# Patient Record
Sex: MALE | Race: WHITE | NOT HISPANIC OR LATINO | Employment: FULL TIME | URBAN - METROPOLITAN AREA
[De-identification: names, ages, dates, MRNs, and addresses within clinical notes are randomized per-mention and may not be internally consistent; named-entity substitution may affect disease eponyms.]

---

## 2018-02-19 ENCOUNTER — OFFICE VISIT (OUTPATIENT)
Dept: FAMILY MEDICINE | Facility: CLINIC | Age: 48
End: 2018-02-19
Payer: COMMERCIAL

## 2018-02-19 VITALS
SYSTOLIC BLOOD PRESSURE: 130 MMHG | HEIGHT: 67 IN | DIASTOLIC BLOOD PRESSURE: 92 MMHG | BODY MASS INDEX: 32.8 KG/M2 | TEMPERATURE: 98.1 F | HEART RATE: 50 BPM | WEIGHT: 209 LBS

## 2018-02-19 DIAGNOSIS — R03.0 ELEVATED BLOOD PRESSURE READING WITHOUT DIAGNOSIS OF HYPERTENSION: ICD-10-CM

## 2018-02-19 DIAGNOSIS — F41.1 GAD (GENERALIZED ANXIETY DISORDER): Primary | ICD-10-CM

## 2018-02-19 PROBLEM — E66.811 OBESITY (BMI 30.0-34.9): Status: ACTIVE | Noted: 2018-02-19

## 2018-02-19 PROCEDURE — 99203 OFFICE O/P NEW LOW 30 MIN: CPT | Performed by: INTERNAL MEDICINE

## 2018-02-19 ASSESSMENT — ANXIETY QUESTIONNAIRES
2. NOT BEING ABLE TO STOP OR CONTROL WORRYING: NOT AT ALL
GAD7 TOTAL SCORE: 1
IF YOU CHECKED OFF ANY PROBLEMS ON THIS QUESTIONNAIRE, HOW DIFFICULT HAVE THESE PROBLEMS MADE IT FOR YOU TO DO YOUR WORK, TAKE CARE OF THINGS AT HOME, OR GET ALONG WITH OTHER PEOPLE: NOT DIFFICULT AT ALL
7. FEELING AFRAID AS IF SOMETHING AWFUL MIGHT HAPPEN: NOT AT ALL
5. BEING SO RESTLESS THAT IT IS HARD TO SIT STILL: NOT AT ALL
1. FEELING NERVOUS, ANXIOUS, OR ON EDGE: SEVERAL DAYS
6. BECOMING EASILY ANNOYED OR IRRITABLE: NOT AT ALL
3. WORRYING TOO MUCH ABOUT DIFFERENT THINGS: NOT AT ALL

## 2018-02-19 ASSESSMENT — PATIENT HEALTH QUESTIONNAIRE - PHQ9: 5. POOR APPETITE OR OVEREATING: NOT AT ALL

## 2018-02-19 NOTE — PROGRESS NOTES
"  SUBJECTIVE:   Black Benton is a 47 year old male who presents to clinic today for the following health issues:    Richard lives with his wife and 15 year old son (has dealt with some behavior problems).  Older 20 year old son is in college.  He works in [employer].   Was following at [facility], but due to insurance change will be following here.  He takes fluoxetine 20 mg for anxiety counseling or change of dose.    Initial blood pressure 160/90, repeat was 130/92.  He does not usually check his blood pressure at home, recently it was high  at the dentist office.  He has been going to the gym more often, trying to be more active, knows he should eat less salt in his diet.        Patient Active Problem List   Diagnosis     Elevated blood pressure reading without diagnosis of hypertension     ALEXIA (generalized anxiety disorder)     Obesity (BMI 30.0-34.9)     No past surgical history on file.    Social History   Substance Use Topics     Smoking status: Never Smoker     Smokeless tobacco: Never Used     Alcohol use Yes     No family history on file.      Allergies   Allergen Reactions     Omeprazole Rash     Sulfa Drugs Rash       Reviewed and updated as needed this visit by clinical staff  Tobacco  Allergies  Meds  Problems  Soc Hx      Reviewed and updated as needed this visit by Provider  Problems         ROS:  Const, cv, pulm, psych reviewed,  otherwise negative unless noted above.       OBJECTIVE:     BP (!) 130/92 (BP Location: Right arm, Patient Position: Chair, Cuff Size: Adult Large)  Pulse 50  Temp 98.1  F (36.7  C) (Tympanic)  Ht 5' 6.75\" (1.695 m)  Wt 209 lb (94.8 kg)  BMI 32.98 kg/m2  Body mass index is 32.98 kg/(m^2).  GENERAL: healthy, alert and no distress  RESP: lungs clear to auscultation - no rales, rhonchi or wheezes  CV: regular rate and rhythm, normal S1 S2, no S3 or S4, no murmur, click or rub  PSYCH: mentation appears normal, affect normal/bright    Diagnostic Test " Results:  none     ASSESSMENT/PLAN:       1. ALEXIA (generalized anxiety disorder)  Doing well, refill ordered   - FLUoxetine (PROZAC) 20 MG capsule; Take 1 capsule (20 mg) by mouth daily  Dispense: 90 capsule; Refill: 3    2. Elevated blood pressure reading without diagnosis of hypertension  Recommended getting home cuff, check a few times per month.  Encouraged to continue working on exercise, eating healthy.      Follow up 4-6 months for physical         Ekaterina Mccray MD  [facility] [place]

## 2018-02-19 NOTE — MR AVS SNAPSHOT
"              After Visit Summary   2/19/2018    Black Benton    MRN: [id number]           Patient Information     Date Of Birth          1970        Visit Information        Provider Department      2/19/2018 8:40 AM Ekaterina Mccray MD [facility] [place]        Today's Diagnoses     ALEXIA (generalized anxiety disorder)    -  1      Care Instructions    Goal for blood pressure is < 120/80.              Follow-ups after your visit        Follow-up notes from your care team     Return in about 6 months (around 8/19/2018) for Physical Exam.      Who to contact     If you have questions or need follow up information about today's clinic visit or your schedule please contact [facility] directly at [phone].  Normal or non-critical lab and imaging results will be communicated to you by MyChart, letter or phone within 4 business days after the clinic has received the results. If you do not hear from us within 7 days, please contact the clinic through MyChart or phone. If you have a critical or abnormal lab result, we will notify you by phone as soon as possible.  Submit refill requests through [employer] or call your pharmacy and they will forward the refill request to us. Please allow 3 business days for your refill to be completed.          Additional Information About Your Visit        MyChart Information     H[employer]Time lets you send messages to your doctor, view your test results, renew your prescriptions, schedule appointments and more. To sign up, go to [email or web address] . Click on \"Log in\" on the left side of the screen, which will take you to the Welcome page. Then click on \"Sign up Now\" on the right side of the page.     You will be asked to enter the access code listed below, as well as some personal information. Please follow the directions to create your username and password.     Your access code is: [email or web address]  Expires: 5/20/2018  9:06 AM     Your access code " "will  in 90 days. If you need help or a new code, please call your [place] clinic or [phone].        Care EveryWhere ID     This is your Care EveryWhere ID. This could be used by other organizations to access your [place] medical records  [id number]        Your Vitals Were     Pulse Temperature Height BMI (Body Mass Index)          50 98.1  F (36.7  C) (Tympanic) 5' 6.75\" (1.695 m) 32.98 kg/m2         Blood Pressure from Last 3 Encounters:   18 163/90    Weight from Last 3 Encounters:   18 209 lb (94.8 kg)              Today, you had the following     No orders found for display         Today's Medication Changes          These changes are accurate as of 18  9:06 AM.  If you have any questions, ask your nurse or doctor.               These medicines have changed or have updated prescriptions.        Dose/Directions    FLUoxetine 20 MG capsule   Commonly known as:  PROzac   This may have changed:    - how much to take  - how to take this  - when to take this   Used for:  ALEXIA (generalized anxiety disorder)   Changed by:  Ekaterina Mccray MD        Dose:  20 mg   Take 1 capsule (20 mg) by mouth daily   Quantity:  90 capsule   Refills:  3            Where to get your medicines      These medications were sent to [facility] [address] [facility] [employer] - [facility] [address] [facility]  [address] [address]     Phone:  [phone]     FLUoxetine 20 MG capsule                Primary Care Provider Office Phone # Fax #    Ekaterina Mccray [phone]       [address] [facility] [address]        Equal Access to Services     SARAH COLE AH: Hadii nas almonte Sopetty, waaxda luqadaha, qaybta kaalmada nedra, delmy johnson. So [facility] [phone].    ATENCIÓN: Si habla español, tiene a stephenson disposición servicios gratuitos de asistencia lingüística. Llame al [phone].    We comply with applicable federal " civil rights laws and [place] laws. We do not discriminate on the basis of race, color, national origin, age, disability, sex, sexual orientation, or gender identity.            Thank you!     Thank you for choosing [facility]  for your care. Our goal is always to provide you with excellent care. Hearing back from our patients is one way we can continue to improve our services. Please take a few minutes to complete the written survey that you may receive in the mail after your visit with us. Thank you!             Your Updated Medication List - Protect others around you: Learn how to safely use, store and throw away your medicines at www.disposemymeds.org.          This list is accurate as of 2/19/18  9:06 AM.  Always use your most recent med list.                   Brand Name Dispense Instructions for use Diagnosis    FLUoxetine 20 MG capsule    PROzac    90 capsule    Take 1 capsule (20 mg) by mouth daily    ALEXIA (generalized anxiety disorder)

## 2018-02-20 ASSESSMENT — PATIENT HEALTH QUESTIONNAIRE - PHQ9: SUM OF ALL RESPONSES TO PHQ QUESTIONS 1-9: 0

## 2018-02-20 ASSESSMENT — ANXIETY QUESTIONNAIRES: GAD7 TOTAL SCORE: 1

## 2018-08-27 ENCOUNTER — OFFICE VISIT (OUTPATIENT)
Dept: FAMILY MEDICINE | Facility: CLINIC | Age: 48
End: 2018-08-27
Payer: COMMERCIAL

## 2018-08-27 VITALS
BODY MASS INDEX: 32.82 KG/M2 | HEART RATE: 104 BPM | SYSTOLIC BLOOD PRESSURE: 130 MMHG | DIASTOLIC BLOOD PRESSURE: 90 MMHG | OXYGEN SATURATION: 100 % | TEMPERATURE: 98.4 F | WEIGHT: 208 LBS

## 2018-08-27 DIAGNOSIS — M99.02 THORACIC SEGMENT DYSFUNCTION: Primary | ICD-10-CM

## 2018-08-27 DIAGNOSIS — E66.811 CLASS 1 OBESITY DUE TO EXCESS CALORIES WITH SERIOUS COMORBIDITY AND BODY MASS INDEX (BMI) OF 32.0 TO 32.9 IN ADULT: ICD-10-CM

## 2018-08-27 DIAGNOSIS — I10 BENIGN ESSENTIAL HYPERTENSION: ICD-10-CM

## 2018-08-27 DIAGNOSIS — E66.09 CLASS 1 OBESITY DUE TO EXCESS CALORIES WITH SERIOUS COMORBIDITY AND BODY MASS INDEX (BMI) OF 32.0 TO 32.9 IN ADULT: ICD-10-CM

## 2018-08-27 PROCEDURE — 99214 OFFICE O/P EST MOD 30 MIN: CPT | Performed by: INTERNAL MEDICINE
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
## 2018-08-27 NOTE — MR AVS SNAPSHOT
After Visit Summary   8/27/2018    Black Benton    MRN: [id number]           Patient Information     Date Of Birth          1970        Visit Information        Provider Department      8/27/2018 3:40 PM Serina Smart MD [facility] [place]        Today's Diagnoses     Thoracic segment dysfunction    -  1       Follow-ups after your visit        Additional Services     [facility] HAND, AND CHIROPRACTIC REFERRAL       **This order will print in the [facility] Scheduling Office**    Physical Therapy, Hand Therapy and Chiropractic Care are available through:    *[facility]  *[facility]  *[facility]    Call one number to schedule at any of the above locations: [phone].    Your provider has referred you to: Chiropractic at [facility] or [facility] [place]    Indication/Reason for Referral: Thoracic back pain    Onset of Illness: About a month  Therapy Orders: Evaluate and Treat  Special Programs: None  Special Request: None    Edgard Rene      Additional Comments for the Therapist or Chiropractor:     Please be aware that coverage of these services is subject to the terms and limitations of your health insurance plan.  Call member services at your health plan with any benefit or coverage questions.      Please bring the following to your appointment:    *Your personal calendar for scheduling future appointments  *Comfortable clothing                  Follow-up notes from your care team     Return in about 2 months (around 10/27/2018) for Physical Exam.      Who to contact     If you have questions or need follow up information about today's clinic visit or your schedule please contact [facility] [place] directly at [phone].  Normal or non-critical lab and imaging results will be communicated to you by MyChart, letter or phone within 4 business days after the clinic has received the results. If you do not hear from us within 7 days,  "please contact the clinic through True Office or phone. If you have a critical or abnormal lab result, we will notify you by phone as soon as possible.  Submit refill requests through True Office or call your pharmacy and they will forward the refill request to us. Please allow 3 business days for your refill to be completed.          Additional Information About Your Visit        [employer] Information     True Office lets you send messages to your doctor, view your test results, renew your prescriptions, schedule appointments and more. To sign up, go to [email or web address] . Click on \"Log in\" on the left side of the screen, which will take you to the Welcome page. Then click on \"Sign up Now\" on the right side of the page.     You will be asked to enter the access code listed below, as well as some personal information. Please follow the directions to create your username and password.     Your access code is: [email or web address]  Expires: 2018  3:31 PM     Your access code will  in 90 days. If you need help or a new code, please call your [place] clinic or [phone].        Care EveryWhere ID     This is your Care EveryWhere ID. This could be used by other organizations to access your [place] medical records  [id number]        Your Vitals Were     Pulse Temperature Pulse Oximetry BMI (Body Mass Index)          104 98.4  F (36.9  C) (Oral) 100% 32.82 kg/m2         Blood Pressure from Last 3 Encounters:   18 130/90   18 (!) 130/92    Weight from Last 3 Encounters:   18 208 lb (94.3 kg)   18 209 lb (94.8 kg)              We Performed the Following     [facility], HAND, AND CHIROPRACTIC REFERRAL        Primary Care Provider Office Phone # Fax #    Ekaterina Mccray [phone]       [address] [place] [address]        Equal Access to Services     PERFECTO COLE AH: Edmond Lezama, waaxda josefaqjoaquim, qaybta clemente sneed, delmy vergara " fred thomas ah. So [facility] [phone].    ATENCIÓN: Si habla rene, tiene a stephenson disposición servicios gratuitos de asistencia lingüística. Savanah al [phone].    We comply with applicable federal civil rights laws and [place] laws. We do not discriminate on the basis of race, color, national origin, age, disability, sex, sexual orientation, or gender identity.            Thank you!     [facility]. Our goal is always to provide you with excellent care. Hearing back from our patients is one way we can continue to improve our services. Please take a few minutes to complete the written survey that you may receive in the mail after your visit with us. Thank you!             Your Updated Medication List - Protect others around you: Learn how to safely use, store and throw away your medicines at www.disposemymeds.org.          This list is accurate as of 8/27/18  4:03 PM.  Always use your most recent med list.                   Brand Name Dispense Instructions for use Diagnosis    FLUoxetine 20 MG capsule    PROzac    90 capsule    Take 1 capsule (20 mg) by mouth daily    ALEXIA (generalized anxiety disorder)

## 2018-09-05 ENCOUNTER — THERAPY VISIT (OUTPATIENT)
Dept: CHIROPRACTIC MEDICINE | Facility: CLINIC | Age: 48
End: 2018-09-05
Payer: COMMERCIAL

## 2018-09-05 DIAGNOSIS — M54.6 ACUTE RIGHT-SIDED THORACIC BACK PAIN: ICD-10-CM

## 2018-09-05 DIAGNOSIS — M40.00 ACQUIRED POSTURAL KYPHOSIS: ICD-10-CM

## 2018-09-05 DIAGNOSIS — M54.2 CERVICALGIA: ICD-10-CM

## 2018-09-05 DIAGNOSIS — M99.02 THORACIC SEGMENT DYSFUNCTION: Primary | ICD-10-CM

## 2018-09-05 DIAGNOSIS — M99.01 CERVICAL SEGMENT DYSFUNCTION: ICD-10-CM

## 2018-09-05 PROCEDURE — 99203 OFFICE O/P NEW LOW 30 MIN: CPT | Mod: 25 | Performed by: CHIROPRACTOR

## 2018-09-05 PROCEDURE — 98940 CHIROPRACT MANJ 1-2 REGIONS: CPT | Mod: AT | Performed by: CHIROPRACTOR

## 2018-09-05 PROCEDURE — 97112 NEUROMUSCULAR REEDUCATION: CPT | Mod: 59 | Performed by: CHIROPRACTOR

## 2018-09-06 PROBLEM — M40.00 ACQUIRED POSTURAL KYPHOSIS: Status: ACTIVE | Noted: 2018-09-06

## 2018-09-06 PROBLEM — M99.02 THORACIC SEGMENT DYSFUNCTION: Status: ACTIVE | Noted: 2018-09-06

## 2018-09-06 PROBLEM — M54.2 CERVICALGIA: Status: ACTIVE | Noted: 2018-09-06

## 2018-09-06 PROBLEM — M54.6 ACUTE RIGHT-SIDED THORACIC BACK PAIN: Status: ACTIVE | Noted: 2018-09-06

## 2018-09-06 PROBLEM — M99.01 CERVICAL SEGMENT DYSFUNCTION: Status: ACTIVE | Noted: 2018-09-06

## 2018-09-06 NOTE — PROGRESS NOTES
Chiropractic Clinic Visit    PCP: Ekaterina Mccray    Black Benton is a 48 year old male who is seen  in consultation at the request of  Serina Smart M.D. presenting with acute upper back and R neck pain . Patient reports that the onset was August 2018.  When asked, patient denies:, falling, slipping, bending and reaching or sleeping awkwardly. The pt reports R sided mid back and lower cervical pain that started in August 2018. He states the px was gradual however it seemed to start after he lifted a heavy pump while working in the yard. There was no sharp pain immediately however the pt noted increasing irritation over the past several weeks. He will feels the px when lifting the R arm and turning the head. Sitting can aggravate the neck area after several hours. The pt grades the px a 1-4/10 on VAS. He states the px is aggravating not debilitating. The pt denies weakness in the extremities or other unusual sx.  Prior to onset, the patient was able to lift the R arm. Patient notes that due to symptoms, they can only lift the R arm slightly prior to onset . Black Benton notes   sitting rated at a 4/10 painful, difficult and prior to this incident it was 0/10.    Injury: There was no injury associated with this episode     Location of Pain: right cervical and upper back at the following level(s) C7 , T1 , T5  and T7   Duration of Pain: one month   Rating of Pain at worst: 4/10  Rating of Pain Currently: 4/10  Symptoms are better with: Nothing  Symptoms are worse with: sitting and lifting the R arm  Additional Features: none        Health History  as reported by the patient:    How does the patient rate their own health:   Good    Current or past medical history:   No red flags identified    Medical allergies  Other: sulfa drugs, prilosec    Past Traumas/Surgeries  Other:  Hernia     Family History  No family history on file.    Medications:  Anti-depressants    Occupation:  Desk job    Primary job  tasks:   Computer work, Lifting/carrying and Prolonged sitting    Barriers as home/work:   none    Additional health Issues:     None       Review of Systems  Musculoskeletal: as above  Remainder of review of systems is negative including constitutional, CV, pulmonary, GI, Skin and Neurologic except as noted in HPI or medical history.    No past medical history on file.  No past surgical history on file.    Objective  There were no vitals taken for this visit.    GENERAL APPEARANCE: healthy, alert and no distress   GAIT: NORMAL  SKIN: no suspicious lesions or rashes  NEURO: Normal strength and tone, mentation intact and speech normal  PSYCH:  mentation appears normal and affect normal/bright    Black was asked to complete the Neck Disability Index, today in the office. NDI Disability score: 14%; pain severity scale: 4/10..    Cervical Spine Exam    Range of Motion:         Full active and passive ROM forward flexion,   Decreased extension, lateral rotation, lateral flexion.    Inspection:         No visible deformity        normal lordotic curvature maintained  Anterior neck carriage, slumped seated posture, poor posture, increased kyphosis      Tender:        upper border of trapezius       R levator scapula, R rhomboid     Non-Tender:        remainder of cervical spine area    Muscle strength:       C4 (shoulder shrug)  symmetric 5/5 Normal       C5 (shoulder abduction) symmetric 5/5 Normal       C6 (elbow flexion) symmetric 5/5 Normal       C7 (elbow extension) symmetric 5/5 Normal       C8 (finger abduction, thumb flexion) symmetric 5/5 Normal    Reflexes:        C5 (biceps) symmetric 2 bilaterally       C6 (supinator) symmetric 2 bilaterally       C7 (triceps) symmetric 2 bilaterally    Sensation:       grossly intact througout bilateral upper extremities    Special Tests:       positive (+) Spurling  Jeremy's- positive, VBI- negative and Bridges Brantley - negative    Lymphatics:        no edema noted in the upper  extremities       Segmental spinal dysfunction/restrictions found at:C7 , T1 , T5  and T7   .      The following soft tissue hypotonicities were observed:Rhomboids: ache and dull pain, referred pain: no  Lev scapulae: ache and dull pain, referred pain: no    Trigger points were found in:none     Muscle spasm found in:Levator scapulae and Rhomboids      Radiology:  None     Assessment:    1. Thoracic segment dysfunction    2. Acute right-sided thoracic back pain    3. Cervical segment dysfunction    4. Cervicalgia    5. Acquired postural kyphosis        RX ordered/plan of care  Anticipated outcomes  Possible risks and side effects    After discussing the risk and benefits of care, patient consented to treatment    Patient's condition:  Patient had restrictions pre-manipulation    Treatment effectiveness:  Post manipulation there is better intersegmental movement and Patient claims to feel looser post manipulation    Plan:    Procedures:    Evaluation and Management:  35230 Moderate level exam 30 min    CMT:  81403 Chiropractic manipulative treatment 1-2 regions performed   Cervical: Diversified and Activator, C7 , Prone  Thoracic: Diversified, T1, T5, T7, Prone    Modalities:  41938: US:  1.4 Quach/cm squared for 2 minutes at 1 mhz   Location: R rhomboid     Therapeutic procedures:  05579: Neurological re-education/proprioception training and proper long term sitting posture: Corrected patient's seated posture when sitting for longer than 20 minutes or seated at the computer related to work duties for over 8 hours per day. Fit patient with Ashley lumbar support for postural re-training with demonstration. Showed patient how to place the support correctly when seated and to increase usage by 2-3 hour increments per day until they are able to sit full time without spinal irritation. Related improper vs. proper sitting to optimal spinal biomechanics using the spine model with demonstration with the purpose of  PREVENTING premature spinal degeneration from cumulative static motion. Demonstrated the increase in load and shearing forces on the spine in addition to the cumulative degenerative effects of axial compression on a spine that is chronically slumped and in an 'unlocked' position vs a 'locked' position. Demonstrated the use of a lap top table for lap top computers to prevent excessive cervical flexion of the neck. Demonstrated 'hip hinging' to access the computer when seated rather than thoracic flexion. Gave cervical retraction for proper cervical alignment, anterior deep cervical flexor strengthening and cervical proprioception training with demonstration. Per 10-12 minutes total      Response to Treatment  Reduction in symptoms as reported by patient    Prognosis: Excellent      Treatment plan and goals:  Goals:  SITTING: the patient specific goal is to attain pre-injury status of  8 hours comfortably    Frequency of care  Duration of care is estimated to be 3-6 weeks, from the initial treatment.  It is estimated that the patient will need a total of 3-6 visits to resolve this episode.  For the initial therapeutic trial of care, the frequency is recommended at 1 X week, once daily.  A reevaluation would be clinically appropriate in 3-6 visits, to determine progress and further course of care.    In-Office Treatment  Evaluation  Spinal Chiropractic Manipulative Therapy   postural correction      Recommendations:    Instructions:use lumbar support when seated at all times     Follow-up:  Return to care in 1 week with US therapy.     Disclaimer: This note consists of symbols derived from keyboarding, dictation and/or voice recognition software. As a result, there may be errors in the script that have gone undetected. Please consider this when interpreting information found in this chart.

## 2018-09-12 ENCOUNTER — THERAPY VISIT (OUTPATIENT)
Dept: CHIROPRACTIC MEDICINE | Facility: CLINIC | Age: 48
End: 2018-09-12
Payer: COMMERCIAL

## 2018-09-12 DIAGNOSIS — M54.6 ACUTE RIGHT-SIDED THORACIC BACK PAIN: ICD-10-CM

## 2018-09-12 DIAGNOSIS — M99.01 CERVICAL SEGMENT DYSFUNCTION: ICD-10-CM

## 2018-09-12 DIAGNOSIS — M54.2 CERVICALGIA: ICD-10-CM

## 2018-09-12 DIAGNOSIS — M99.02 THORACIC SEGMENT DYSFUNCTION: Primary | ICD-10-CM

## 2018-09-12 DIAGNOSIS — M40.00 ACQUIRED POSTURAL KYPHOSIS: ICD-10-CM

## 2018-09-12 PROCEDURE — 97035 APP MDLTY 1+ULTRASOUND EA 15: CPT | Performed by: CHIROPRACTOR

## 2018-09-12 PROCEDURE — 98940 CHIROPRACT MANJ 1-2 REGIONS: CPT | Mod: AT | Performed by: CHIROPRACTOR

## 2018-09-13 NOTE — PROGRESS NOTES
Visit #:  2    Subjective:  Black Benton is a 48 year old male who is seen in f/u up for:        Thoracic segment dysfunction  Acute right-sided thoracic back pain  Cervical segment dysfunction  Cervicalgia  Acquired postural kyphosis.     Since last visit on 9/5/2018,  Black Benton reports:    Area of chief complaint:  Thoracic :  Symptoms are graded at 3/10. The quality is described as stiff, achey, dull.  Motion has increased, but is still not normal. The pt reports 5% improvement in the mid back area. He states the px has moved to the left side of the mid back. He reports irritation when he lifts the L arm. He is unable to lift heavy objects. The pt denies weakness or other unusual sx. Patient feels that they are improved due to a reduction in symptoms.     Since last visit the patient feels that they are 5 percent  improved from last visit.       Objective:  The following was observed:    P: palpatory tendernessRhomboids and Sub-occipital L>>R C7/T1/T5  A: static palpation demonstrates intersegmental asymmetry   R: motion palpation notes restricted motion  T: hypertonicity at: Rhomboids and T-spine paraspinal Bilaterally    Segmental spinal dysfunction/restrictions found at:  C7/T1/T5      Assessment:    Diagnoses:      1. Thoracic segment dysfunction    2. Acute right-sided thoracic back pain    3. Cervical segment dysfunction    4. Cervicalgia    5. Acquired postural kyphosis        Patient's condition:  Patient had restrictions pre-manipulation    Treatment effectiveness:  Post manipulation there is better intersegmental movement and Patient claims to feel looser post manipulation      Procedures:  CMT:  85585 Chiropractic manipulative treatment 1-2 regions performed   Cervical: Diversified, C7 , Prone  Thoracic: Diversified, T1, T6, Prone    Modalities:  71314: US:  1.6  Quach/cm squared for 8  minutes at 1 mhz   Location: L R/C L teres major/minor    Therapeutic procedures:  None    Response to  Treatment  Reduction in symptoms as reported by patient    Prognosis: Good    Progress towards Goals: Patient is making progress towards the goal.Goals:  SITTING: the patient specific goal is to attain pre-injury status of  8 hours comfortably         Recommendations:    Instructions:continue  working on posture     Follow-up:  Return to care in 2 week with US therapy  ACP discussion.     Lat*  Cervical flexion supine seated*  Levator*   Rhomboid*

## 2018-10-10 ENCOUNTER — THERAPY VISIT (OUTPATIENT)
Dept: CHIROPRACTIC MEDICINE | Facility: CLINIC | Age: 48
End: 2018-10-10
Payer: COMMERCIAL

## 2018-10-10 DIAGNOSIS — M54.6 ACUTE RIGHT-SIDED THORACIC BACK PAIN: ICD-10-CM

## 2018-10-10 DIAGNOSIS — M99.02 THORACIC SEGMENT DYSFUNCTION: Primary | ICD-10-CM

## 2018-10-10 DIAGNOSIS — M99.01 CERVICAL SEGMENT DYSFUNCTION: ICD-10-CM

## 2018-10-10 DIAGNOSIS — M54.2 CERVICALGIA: ICD-10-CM

## 2018-10-10 PROBLEM — M40.00 ACQUIRED POSTURAL KYPHOSIS: Status: RESOLVED | Noted: 2018-09-06 | Resolved: 2018-10-10

## 2018-10-10 PROCEDURE — 97035 APP MDLTY 1+ULTRASOUND EA 15: CPT | Performed by: CHIROPRACTOR

## 2018-10-10 PROCEDURE — 98940 CHIROPRACT MANJ 1-2 REGIONS: CPT | Mod: AT | Performed by: CHIROPRACTOR

## 2018-10-10 NOTE — PROGRESS NOTES
Visit #:  3    Subjective:  Black Benton is a 48 year old male who is seen in f/u up for:        Thoracic segment dysfunction  Acute right-sided thoracic back pain  Cervical segment dysfunction  Cervicalgia  Acquired postural kyphosis.     Since last visit , Black Benton reports:    Area of chief complaint:  Thoracic :  Symptoms are graded at 3/10. The quality is described as stiff, achey, dull.  Motion has increased, but is still not normal. The pt reports 20% improvement in the mid back area. He states he was able to perform lifting activities with less pain. At times the pain will change sides. Overall he feels he is improving. The px does not radiate into the extremities.     Since last visit the patient feels that they are 20 percent  improved from last visit.       Objective:  The following was observed:    P: palpatory tendernessRhomboids and Sub-occipital L>>R C7/T1/T5  A: static palpation demonstrates intersegmental asymmetry   R: motion palpation notes restricted motion  T: hypertonicity at: Rhomboids and T-spine paraspinal Bilaterally    Segmental spinal dysfunction/restrictions found at:  C7/T1/T5      Assessment:    Diagnoses:      1. Thoracic segment dysfunction    2. Acute right-sided thoracic back pain    3. Cervical segment dysfunction    4. Cervicalgia    5. Acquired postural kyphosis        Patient's condition:  Patient had restrictions pre-manipulation    Treatment effectiveness:  Post manipulation there is better intersegmental movement and Patient claims to feel looser post manipulation      Procedures:  CMT:  32878 Chiropractic manipulative treatment 1-2 regions performed   Cervical: Diversified, C7 , Prone  Thoracic: Diversified, T1, T6, Prone    Modalities:  39054: US:  1.9 Quach/cm squared for 8  minutes at 1 mhz   Location: L R/C L teres major/minor    Therapeutic procedures:  None    Response to Treatment  Reduction in symptoms as reported by patient    Prognosis:  Good    Progress towards Goals: Patient is making progress towards the goal.Goals:  SITTING: the patient specific goal is to attain pre-injury status of  8 hours comfortably         Recommendations:    Instructions:continue  working on posture     Follow-up:  Return to care 2 weeks with ACP  Lat*  Cervical flexion supine seated*  Levator*   Rhomboid*

## 2018-10-24 ENCOUNTER — THERAPY VISIT (OUTPATIENT)
Dept: CHIROPRACTIC MEDICINE | Facility: CLINIC | Age: 48
End: 2018-10-24
Payer: COMMERCIAL

## 2018-10-24 DIAGNOSIS — M99.02 THORACIC SEGMENT DYSFUNCTION: Primary | ICD-10-CM

## 2018-10-24 DIAGNOSIS — M54.6 ACUTE RIGHT-SIDED THORACIC BACK PAIN: ICD-10-CM

## 2018-10-24 DIAGNOSIS — M25.512 ACUTE PAIN OF LEFT SHOULDER: ICD-10-CM

## 2018-10-24 DIAGNOSIS — M54.6 ACUTE LEFT-SIDED THORACIC BACK PAIN: ICD-10-CM

## 2018-10-24 DIAGNOSIS — M54.2 CERVICALGIA: ICD-10-CM

## 2018-10-24 DIAGNOSIS — M99.01 CERVICAL SEGMENT DYSFUNCTION: ICD-10-CM

## 2018-10-24 PROCEDURE — 98940 CHIROPRACT MANJ 1-2 REGIONS: CPT | Mod: AT | Performed by: CHIROPRACTOR

## 2018-10-24 PROCEDURE — 97110 THERAPEUTIC EXERCISES: CPT | Performed by: CHIROPRACTOR

## 2018-10-24 PROCEDURE — 97035 APP MDLTY 1+ULTRASOUND EA 15: CPT | Performed by: CHIROPRACTOR

## 2018-10-24 NOTE — PROGRESS NOTES
Visit #:  4    Subjective:  Black Benton is a 48 year old male who is seen in f/u up for:        Thoracic segment dysfunction  Acute right-sided thoracic back pain  Cervical segment dysfunction  Cervicalgia  Acute pain of left shoulder  Acute left-sided thoracic back pain.     Since last visit , Black Benton reports:    Area of chief complaint:  Thoracic :  Symptoms are graded at 3/10. The quality is described as stiff, achey, dull.  Motion has increased, but is still not normal. The pt reports less than  50% improvement since initial presentation. He states his overall function is better however he continues to feel pain on the L side of the mid back when he is performing activities with the L upper extremities. He is able to lift the arm with less pain. The pt denies weakness or other unusual sx.     Since last visit the patient feels that they are 50 percent  improved from last visit.       Objective:  The following was observed:    P: palpatory tendernessRhomboids and Sub-occipital L>>R C7/T1/T5  A: static palpation demonstrates intersegmental asymmetry   R: motion palpation notes restricted motion  T: hypertonicity at: Rhomboids and T-spine paraspinal Bilaterally    Segmental spinal dysfunction/restrictions found at:  C7/T1/T5      Assessment:    Diagnoses:      1. Thoracic segment dysfunction    2. Acute right-sided thoracic back pain    3. Cervical segment dysfunction    4. Cervicalgia    5. Acute pain of left shoulder    6. Acute left-sided thoracic back pain        Patient's condition:  Patient responding slowly to therapy     Treatment effectiveness:  Post manipulation there is better intersegmental movement and Patient claims to feel looser post manipulation      Procedures:  CMT:  43168 Chiropractic manipulative treatment 1-2 regions performed   Cervical: Diversified, C7 , Prone  Thoracic: Diversified, T1, T6, Prone    Modalities:  52859: US:  1.9 Quach/cm squared for 8  minutes at 1 mhz    Location: L R/C L teres major/minor    Therapeutic procedures:  Gave latissimus stretch overhead and pulling the elbow behind the head with demonstration as per stretch protocol.   Gave levator scapula stretch with demonstration. Gave levator with rotation at differing angles with demonstration as per protocol.   Gave supine and seated cervical flexion with demonstration  Per 9 minutes     Response to Treatment  Reduction in symptoms as reported by patient    Prognosis: Good    Progress towards Goals: Patient is making progress towards the goal.Goals:  SITTING: the patient specific goal is to attain pre-injury status of  8 hours comfortably         Recommendations:    Instructions:continue  working on posture     Follow-up:  Return to care 2 weeks with ACP

## 2018-11-14 ENCOUNTER — THERAPY VISIT (OUTPATIENT)
Dept: PHYSICAL THERAPY | Facility: CLINIC | Age: 48
End: 2018-11-14
Payer: COMMERCIAL

## 2018-11-14 DIAGNOSIS — M54.6 RIGHT-SIDED THORACIC BACK PAIN: Primary | ICD-10-CM

## 2018-11-14 PROCEDURE — 97110 THERAPEUTIC EXERCISES: CPT | Mod: GP | Performed by: PHYSICAL THERAPIST

## 2018-11-14 PROCEDURE — 97161 PT EVAL LOW COMPLEX 20 MIN: CPT | Mod: GP | Performed by: PHYSICAL THERAPIST

## 2018-11-14 NOTE — PROGRESS NOTES
[facility] Initial Evaluation  Subjective:  Patient is a 48 year old male presenting with rehab cervical spine hpi.   Black Benton is a 48 year old male with a thoracic spine condition.  Condition occurred with:  Repetition/overuse.  Condition occurred: at work and at home.  This is a new condition  Patient is referred with a 3 1/2 month hx of R thoracic pain. He thinks it may be related to lifting but does not recall a specific event or injury. Sxs localized to R medial and subscapular areas. Tends to be worse with prolonged sitting, R trunk rotation and with repetitive reaching with R U/E..    Patient reports pain:  Thoracic right side.  Radiates to:  None.  Pain is described as aching and is intermittent and reported as 3/10.  Associated symptoms:  Loss of motion/stiffness. Pain is worse during the day and worse in the P.M..  Symptoms are exacerbated by lifting and other (reaching with R arm, R trunk rotation) and relieved by nothing.  Since onset symptoms are unchanged.    Previous treatment includes chiropractic.  There was mild improvement following previous treatment.  General health as reported by patient is good.        Current medications:  Anti-depressants.    Patient is working in normal job without restrictions.  Primary job tasks include:  Prolonged sitting.    Barriers include:  None as reported by the patient.    Red flags:  None as reported by the patient.                        Objective:  System    Physical Exam                           Rehana Thoracic Evalution:  Posture:  Sitting: poor  Standing: fair      Correction of Posture: no effect  Other observations:  Rounded shoulders  Movement Loss:  Flexion (Flex):  Nil  Extension (EXT): nil  Rotation Lt (ROT L): nil  Rotation Rt (ROT R): nil and pain  Cervical Differential:                Rep Flex:  During:  Increases  After: no worse  Mechanical Response:  No effect  Test Movements:  Flexion:  During:  Increases  After:  no worse  Mechanical Response:  No effect  Rep Flexion:  During:  Increases  After: no worse  Mechanical Response: no effect  Extension:  During:  No effect  After: no effect  Mechanical Response: no effect  Rep Extension:  During:  Decreases  After: no better  Mechanical Response: IncROM    Pretest symptoms: R thoracic  EIL Prone:  During:  No effect  After: no effect  Mechanical Response: no effect  Rep EIL Prone:  During:  No effect  After: no effect  Mechanical Response:  No effect                  Conclusion: derangement  Principle of Treatment:      Extension:  Thoracic extension in sitting x 10/ 2 hours    Other:  Foam roll, gas pedal                  ROS    Assessment/Plan:    Patient is a 48 year old male with thoracic complaints.    Patient has the following significant findings with corresponding treatment plan.                Diagnosis 1:  R thoracic pain  Pain -  manual therapy, self management, education, directional preference exercise and home program  Decreased ROM/flexibility - manual therapy, therapeutic exercise and home program  Decreased function - therapeutic activities and home program    Therapy Evaluation Codes:   1) History comprised of:   Personal factors that impact the plan of care:      None.    Comorbidity factors that impact the plan of care are:      None.     Medications impacting care: None.  2) Examination of Body Systems comprised of:   Body structures and functions that impact the plan of care:      Thoracic Spine.   Activity limitations that impact the plan of care are:      Lifting, Reading/Computer work and Sitting.  3) Clinical presentation characteristics are:   Stable/Uncomplicated.  4) Decision-Making    Low complexity using standardized patient assessment instrument and/or measureable assessment of functional outcome.  Cumulative Therapy Evaluation is: Low complexity.    Previous and current functional limitations:  (See Goal Flow Sheet for this information)    Short  term and Long term goals: (See Goal Flow Sheet for this information)     Communication ability:  Patient appears to be able to clearly communicate and understand verbal and written communication and follow directions correctly.  Treatment Explanation - The following has been discussed with the patient:   RX ordered/plan of care  Anticipated outcomes  Possible risks and side effects  This patient would benefit from PT intervention to resume normal activities.   Rehab potential is good.    Frequency:  1 X week, once daily  Duration:  for 4 weeks  Discharge Plan:  Achieve all LTG.  Independent in home treatment program.  Reach maximal therapeutic benefit.    Please refer to the daily flowsheet for treatment today, total treatment time and time spent performing 1:1 timed codes.

## 2018-11-14 NOTE — MR AVS SNAPSHOT
"              After Visit Summary   11/14/2018    Black Benton    MRN: [id number]           Patient Information     Date Of Birth          1970        Visit Information        Provider Department      11/14/2018 4:30 PM Randal Bonilla PT [facility]        Today's Diagnoses     Right-sided thoracic back pain    -  1       Follow-ups after your visit        Your next 10 appointments already scheduled     Nov 28, 2018  4:30 PM CST   (Arrive by 4:15 PM)   [facility] Spine with Randal Bonilla PT   [facility] ([facility] [place])    [address]  [address]  [place] [address] [phone]              Who to contact     If you have questions or need follow up information about today's clinic visit or your schedule please contact [facility]ERAP[facility] directly at [phone].  Normal or non-critical lab and imaging results will be communicated to you by Crescentratinghart, letter or phone within 4 business days after the clinic has received the results. If you do not hear from us within 7 days, please contact the clinic through Yostrot or phone. If you have a critical or abnormal lab result, we will notify you by phone as soon as possible.  Submit refill requests through RiparAutOnline or call your pharmacy and they will forward the refill request to us. Please allow 3 business days for your refill to be completed.          Additional Information About Your Visit        Crescentratinghart Information     [employer]Online lets you send messages to your doctor, view your test results, renew your prescriptions, schedule appointments and more. To sign up, go to [email or web address] . Click on \"Log in\" on the left side of the screen, which will take you to the Welcome page. Then click on \"Sign up Now\" on the right side of the page.     You will be asked to enter the access code listed below, as " well as some personal information. Please follow the directions to create your username and password.     Your access code is: [email or web address]  Expires: 2018  2:31 PM     Your access code will  in 90 days. If you need help or a new code, please call your [facility] clinic or [phone].        Care EveryWhere ID     This is your Care EveryWhere ID. This could be used by other organizations to access your [facility] medical records  [id number]         Blood Pressure from Last 3 Encounters:   18 130/90   18 (!) 130/92    Weight from Last 3 Encounters:   18 94.3 kg (208 lb)   18 94.8 kg (209 lb)              We Performed the Following     HC PT EVAL, LOW COMPLEXITY     CAR[facility] INITIAL EVAL REPORT     THERAPEUTIC EXERCISES        Primary Care Provider Office Phone # Fax #    Ekaterina Mccray [phone]       [address] [address]        Equal Access to Services     [facility]: Hadii aad ku hadasho Soomaali, waaxda luqadaha, qaybta kaalmada adeegyada, waxay idiin haygareth thomas . So [facility] [phone].    ATENCIÓN: Si habla español, tiene a stephenson disposición servicios gratuitos de asistencia lingüística. Llame al [phone].    We comply with applicable federal civil rights laws and [place] laws. We do not discriminate on the basis of race, color, national origin, age, disability, sex, sexual orientation, or gender identity.            Thank you!     Thank you for choosing [facility] PHYSICAL THER[facility]  for your care. Our goal is always to provide you with excellent care. Hearing back from our patients is one way we can continue to improve our services. Please take a few minutes to complete the written survey that you may receive in the mail after your visit with us. Thank you!             Your Updated Medication List - Protect others around you: Learn how to safely use, store and throw away your  medicines at www.disposemymeds.org.          This list is accurate as of 11/14/18 11:59 PM.  Always use your most recent med list.                   Brand Name Dispense Instructions for use Diagnosis    FLUoxetine 20 MG capsule    PROzac    90 capsule    Take 1 capsule (20 mg) by mouth daily    ALEXIA (generalized anxiety disorder)

## 2018-11-15 PROBLEM — M54.6 RIGHT-SIDED THORACIC BACK PAIN: Status: ACTIVE | Noted: 2018-11-15

## 2018-11-28 ENCOUNTER — THERAPY VISIT (OUTPATIENT)
Dept: PHYSICAL THERAPY | Facility: CLINIC | Age: 48
End: 2018-11-28
Payer: COMMERCIAL

## 2018-11-28 DIAGNOSIS — M54.6 RIGHT-SIDED THORACIC BACK PAIN: ICD-10-CM

## 2018-11-28 PROCEDURE — 97140 MANUAL THERAPY 1/> REGIONS: CPT | Mod: GP | Performed by: PHYSICAL THERAPIST

## 2018-11-28 PROCEDURE — 97110 THERAPEUTIC EXERCISES: CPT | Mod: GP | Performed by: PHYSICAL THERAPIST

## 2018-11-28 PROCEDURE — 97035 APP MDLTY 1+ULTRASOUND EA 15: CPT | Mod: GP | Performed by: PHYSICAL THERAPIST

## 2018-12-12 ENCOUNTER — THERAPY VISIT (OUTPATIENT)
Dept: PHYSICAL THERAPY | Facility: CLINIC | Age: 48
End: 2018-12-12
Payer: COMMERCIAL

## 2018-12-12 DIAGNOSIS — M54.6 RIGHT-SIDED THORACIC BACK PAIN: ICD-10-CM

## 2018-12-12 PROCEDURE — 97140 MANUAL THERAPY 1/> REGIONS: CPT | Mod: GP | Performed by: PHYSICAL THERAPIST

## 2018-12-12 PROCEDURE — 97035 APP MDLTY 1+ULTRASOUND EA 15: CPT | Mod: GP | Performed by: PHYSICAL THERAPIST

## 2018-12-12 PROCEDURE — 97110 THERAPEUTIC EXERCISES: CPT | Mod: GP | Performed by: PHYSICAL THERAPIST

## 2019-01-09 ENCOUNTER — THERAPY VISIT (OUTPATIENT)
Dept: PHYSICAL THERAPY | Facility: CLINIC | Age: 49
End: 2019-01-09
Payer: COMMERCIAL

## 2019-01-09 DIAGNOSIS — M54.6 RIGHT-SIDED THORACIC BACK PAIN: ICD-10-CM

## 2019-01-09 PROCEDURE — 97140 MANUAL THERAPY 1/> REGIONS: CPT | Mod: GP | Performed by: PHYSICAL THERAPIST

## 2019-01-09 PROCEDURE — 97035 APP MDLTY 1+ULTRASOUND EA 15: CPT | Mod: GP | Performed by: PHYSICAL THERAPIST

## 2019-01-09 PROCEDURE — 97110 THERAPEUTIC EXERCISES: CPT | Mod: GP | Performed by: PHYSICAL THERAPIST

## 2019-03-12 ENCOUNTER — OFFICE VISIT (OUTPATIENT)
Dept: FAMILY MEDICINE | Facility: CLINIC | Age: 49
End: 2019-03-12
Payer: COMMERCIAL

## 2019-03-12 VITALS
OXYGEN SATURATION: 98 % | HEART RATE: 74 BPM | SYSTOLIC BLOOD PRESSURE: 126 MMHG | HEIGHT: 67 IN | TEMPERATURE: 97.6 F | DIASTOLIC BLOOD PRESSURE: 84 MMHG | WEIGHT: 212 LBS | BODY MASS INDEX: 33.27 KG/M2

## 2019-03-12 DIAGNOSIS — Z13.6 CARDIOVASCULAR SCREENING; LDL GOAL LESS THAN 160: ICD-10-CM

## 2019-03-12 DIAGNOSIS — E66.09 CLASS 1 OBESITY DUE TO EXCESS CALORIES WITH SERIOUS COMORBIDITY AND BODY MASS INDEX (BMI) OF 32.0 TO 32.9 IN ADULT: ICD-10-CM

## 2019-03-12 DIAGNOSIS — F41.1 GAD (GENERALIZED ANXIETY DISORDER): ICD-10-CM

## 2019-03-12 DIAGNOSIS — E66.811 CLASS 1 OBESITY DUE TO EXCESS CALORIES WITH SERIOUS COMORBIDITY AND BODY MASS INDEX (BMI) OF 32.0 TO 32.9 IN ADULT: ICD-10-CM

## 2019-03-12 DIAGNOSIS — Z00.00 ROUTINE HISTORY AND PHYSICAL EXAMINATION OF ADULT: Primary | ICD-10-CM

## 2019-03-12 DIAGNOSIS — Z13.9 SCREENING FOR CONDITION: ICD-10-CM

## 2019-03-12 PROCEDURE — 99396 PREV VISIT EST AGE 40-64: CPT | Performed by: FAMILY MEDICINE

## 2019-03-12 ASSESSMENT — ANXIETY QUESTIONNAIRES
1. FEELING NERVOUS, ANXIOUS, OR ON EDGE: NOT AT ALL
7. FEELING AFRAID AS IF SOMETHING AWFUL MIGHT HAPPEN: NOT AT ALL
3. WORRYING TOO MUCH ABOUT DIFFERENT THINGS: NOT AT ALL
5. BEING SO RESTLESS THAT IT IS HARD TO SIT STILL: NOT AT ALL
2. NOT BEING ABLE TO STOP OR CONTROL WORRYING: NOT AT ALL
6. BECOMING EASILY ANNOYED OR IRRITABLE: NOT AT ALL

## 2019-03-12 ASSESSMENT — MIFFLIN-ST. JEOR: SCORE: 1792.87

## 2019-03-12 NOTE — LETTER
My Depression Action Plan  Name: Black Benton   Date of Birth 1970  Date: 3/12/2019    My doctor: Ekaterina Mccray   My clinic: [address] [address]  [phone]          GREEN    ZONE   Good Control    What it looks like:     Things are going generally well. You have normal up s and down s. You may even feel depressed from time to time, but bad moods usually last less than a day.   What you need to do:  1. Continue to care for yourself (see self care plan)  2. Check your depression survival kit and update it as needed  3. Follow your physician s recommendations including any medication.  4. Do not stop taking medication unless you consult with your physician first.           YELLOW         ZONE Getting Worse    What it looks like:     Depression is starting to interfere with your life.     It may be hard to get out of bed; you may be starting to isolate yourself from others.    Symptoms of depression are starting to last most all day and this has happened for several days.     You may have suicidal thoughts but they are not constant.   What you need to do:     1. Call your care team, your response to treatment will improve if you keep your care team informed of your progress. Yellow periods are signs an adjustment may need to be made.     2. Continue your self-care, even if you have to fake it!    3. Talk to someone in your support network    4. Open up your depression survival kit           RED    ZONE Medical Alert - Get Help    What it looks like:     Depression is seriously interfering with your life.     You may experience these or other symptoms: You can t get out of bed most days, can t work or engage in other necessary activities, you have trouble taking care of basic hygiene, or basic responsibilities, thoughts of suicide or death that will not go away, self-injurious behavior.     What you need to do:  1. Call your  care team and request a same-day appointment. If they are not available (weekends or after hours) call your local crisis line, emergency room or 911.            Depression Self Care Plan / Survival Kit    Self-Care for Depression  Here s the deal. Your body and mind are really not as separate as most people think.  What you do and think affects how you feel and how you feel influences what you do and think. This means if you do things that people who feel good do, it will help you feel better.  Sometimes this is all it takes.  There is also a place for medication and therapy depending on how severe your depression is, so be sure to consult with your medical provider and/ or Behavioral Health Consultant if your symptoms are worsening or not improving.     In order to better manage my stress, I will:    Exercise  Get some form of exercise, every day. This will help reduce pain and release endorphins, the  feel good  chemicals in your brain. This is almost as good as taking antidepressants!  This is not the same as joining a gym and then never going! (they count on that by the way ) It can be as simple as just going for a walk or doing some gardening, anything that will get you moving.      Hygiene   Maintain good hygiene (Get out of bed in the morning, Make your bed, Brush your teeth, Take a shower, and Get dressed like you were going to work, even if you are unemployed).  If your clothes don't fit try to get ones that do.    Diet  I will strive to eat foods that are good for me, drink plenty of water, and avoid excessive sugar, caffeine, alcohol, and other mood-altering substances.  Some foods that are helpful in depression are: complex carbohydrates, B vitamins, flaxseed, fish or fish oil, fresh fruits and vegetables.    Psychotherapy  I agree to participate in Individual Therapy (if recommended).    Medication  If prescribed medications, I agree to take them.  Missing doses can result in serious side effects.  I  understand that drinking alcohol, or other illicit drug use, may cause potential side effects.  I will not stop my medication abruptly without first discussing it with my provider.    Staying Connected With Others  I will stay in touch with my friends, family members, and my primary care provider/team.    Use your imagination  Be creative.  We all have a creative side; it doesn t matter if it s oil painting, sand castles, or mud pies! This will also kick up the endorphins.    Witness Beauty  (AKA stop and smell the roses) Take a look outside, even in mid-winter. Notice colors, textures. Watch the squirrels and birds.     Service to others  Be of service to others.  There is always someone else in need.  By helping others we can  get out of ourselves  and remember the really important things.  This also provides opportunities for practicing all the other parts of the program.    Humor  Laugh and be silly!  Adjust your TV habits for less news and crime-drama and more comedy.    Control your stress  Try breathing deep, massage therapy, biofeedback, and meditation. Find time to relax each day.     My support system    Clinic Contact:  Phone number:    Contact 1:  Phone number:    Contact 2:  Phone number:    Islam/:  Phone number:    Therapist:  Phone number:    Local crisis center:    Phone number:    Other community support:  Phone number:

## 2019-03-12 NOTE — PROGRESS NOTES
SUBJECTIVE:   CC: Black Benton is an 48 year old male who presents for preventive health visit.     Healthy Habits:    Do you get at least three servings of calcium containing foods daily (dairy, green leafy vegetables, etc.)? yes    Amount of exercise or daily activities, outside of work: 3-4 day(s) per week    Problems taking medications regularly No    Medication side effects: No    Have you had an eye exam in the past two years? yes    Do you see a dentist twice per year? yes    Do you have sleep apnea, excessive snoring or daytime drowsiness?yes snoring       PROBLEMS TO ADD ON...  Depression and Anxiety Follow-Up    Status since last visit: No change, has been on it for 20 + years , working well he tried ging down but he needed to ho back, s comfortbale continuing for now     Other associated symptoms:See phq-9 and jorge 7    Complicating factors: none     Significant life event: No     Current substance abuse: None    PHQ 2/19/2018   PHQ-9 Total Score 0   Q9: Suicide Ideation Not at all     JORGE-7 SCORE 2/19/2018   Total Score 1       PHQ-9  English  PHQ-9   Any Language  JORGE-7  Suicide Assessment Five-step Evaluation and Treatment (SAFE-T)    Today's PHQ-2 Score:   PHQ-2 ( 1999 Pfizer) 3/12/2019   Q1: Little interest or pleasure in doing things 0   Q2: Feeling down, depressed or hopeless 0   PHQ-2 Score 0       Abuse: Current or Past(Physical, Sexual or Emotional)- No  Do you feel safe in your environment? Yes    Social History     Tobacco Use     Smoking status: Never Smoker     Smokeless tobacco: Never Used   Substance Use Topics     Alcohol use: Yes     If you drink alcohol do you typically have >3 drinks per day or >7 drinks per week? No                      Last PSA: No results found for: PSA    Reviewed orders with patient. Reviewed health maintenance and updated orders accordingly - Yes  Patient Active Problem List   Diagnosis     JORGE (generalized anxiety disorder)     Class 1 obesity due to  excess calories with serious comorbidity and body mass index (BMI) of 32.0 to 32.9 in adult     Benign essential hypertension     Thoracic segment dysfunction     Acute right-sided thoracic back pain     Cervical segment dysfunction     Cervicalgia     Right-sided thoracic back pain     Past Surgical History:   Procedure Laterality Date     APPENDECTOMY      at age 17      HERNIA REPAIR, INGUINAL RT/LT      multiple , age  mid 32      TONSILLECTOMY      at age 19        Social History     Tobacco Use     Smoking status: Never Smoker     Smokeless tobacco: Never Used   Substance Use Topics     Alcohol use: Yes     Family History   Problem Relation Age of Onset     Hypertension Father      Anxiety Disorder Maternal Grandmother      Depression Maternal Grandmother      Diabetes No family hx of      Coronary Artery Disease No family hx of      Cerebrovascular Disease No family hx of      Hyperlipidemia No family hx of      Colon Cancer No family hx of      Prostate Cancer No family hx of            Reviewed and updated as needed this visit by clinical staff         Reviewed and updated as needed this visit by Provider        No past medical history on file.     ROS:  CONSTITUTIONAL: NEGATIVE for fever, chills, change in weight  INTEGUMENTARY/SKIN: NEGATIVE for worrisome rashes, moles or lesions  EYES: NEGATIVE for vision changes or irritation  ENT: NEGATIVE for ear, mouth and throat problems  RESP: NEGATIVE for significant cough or SOB  CV: NEGATIVE for chest pain, palpitations or peripheral edema  GI: NEGATIVE for nausea, abdominal pain, heartburn, or change in bowel habits   male: negative for dysuria, hematuria, decreased urinary stream, erectile dysfunction, urethral discharge  MUSCULOSKELETAL: NEGATIVE for significant arthralgias or myalgia  NEURO: NEGATIVE for weakness, dizziness or paresthesias  ENDOCRINE: NEGATIVE for temperature intolerance, skin/hair changes  HEME/ALLERGY/IMMUNE: NEGATIVE for bleeding  problems  PSYCHIATRIC: NEGATIVE for changes in mood or affect    OBJECTIVE:   There were no vitals taken for this visit.  EXAM:  GENERAL: healthy, alert and no distress  EYES: Eyes grossly normal to inspection, PERRL and conjunctivae and sclerae normal  HENT: ear canals and TM's normal, nose and mouth without ulcers or lesions  NECK: no adenopathy, no asymmetry, masses, or scars and thyroid normal to palpation  RESP: lungs clear to auscultation - no rales, rhonchi or wheezes  CV: regular rate and rhythm, normal S1 S2, no S3 or S4, no murmur, click or rub, no peripheral edema and peripheral pulses strong  ABDOMEN: soft, nontender, no hepatosplenomegaly, no masses and bowel sounds normal   (male): normal male genitalia without lesions or urethral discharge, no hernia  RECTAL: deferred  MS: no gross musculoskeletal defects noted, no edema  SKIN: no suspicious lesions or rashes  NEURO: Normal strength and tone, mentation intact and speech normal  PSYCH: mentation appears normal, affect normal/bright        ASSESSMENT/PLAN:   (Z00.00) Routine history and physical examination of adult  (primary encounter diagnosis)  Comment:   Plan: GLUCOSE, Lipid panel reflex to direct LDL         Fasting, **HIV Antigen Antibody Combo FUTURE         anytime            (F41.1) ALEXIA (generalized anxiety disorder)  Comment:   Plan: FLUoxetine (PROZAC) 20 MG capsule, DEPRESSION         ACTION PLAN (DAP)            (Z13.9) Screening for condition  Comment:   Plan: **HIV Antigen Antibody Combo FUTURE anytime            (Z13.6) CARDIOVASCULAR SCREENING; LDL GOAL LESS THAN 160  Comment:   Plan:     (E66.09,  Z68.32) Class 1 obesity due to excess calories with serious comorbidity and body mass index (BMI) of 32.0 to 32.9 in adult  Comment:   Plan:     COUNSELING:  Reviewed preventive health counseling, as reflected in patient instructions       Regular exercise       Healthy diet/nutrition       Vision screening       Immunizations    Declined:  "Influenza due to does not want it               HIV screeninx in teen years, 1x in adult years, and at intervals if high risk    BP Readings from Last 1 Encounters:   20 130/90     Estimated body mass index is 32.82 kg/m  as calculated from the following:    Height as of 20: 1.695 m (5' 6.75\").    Weight as of 20: 94.3 kg (208 lb).      Weight management plan: Discussed healthy diet and exercise guidelines     reports that  has never smoked. he has never used smokeless tobacco.      Counseling Resources:  ATP IV Guidelines  Pooled Cohorts Equation Calculator  FRAX Risk Assessment  ICSI Preventive Guidelines  Dietary Guidelines for Americans, 2010  USDA's MyPlate  ASA Prophylaxis  Lung CA Screening    Kaia Bland MD  [facility] [place]    "

## 2019-03-26 DIAGNOSIS — Z13.9 SCREENING FOR CONDITION: ICD-10-CM

## 2019-03-26 DIAGNOSIS — Z00.00 ROUTINE HISTORY AND PHYSICAL EXAMINATION OF ADULT: ICD-10-CM

## 2019-03-26 LAB
CHOLEST SERPL-MCNC: 206 MG/DL
GLUCOSE SERPL-MCNC: 100 MG/DL (ref 70–99)
HDLC SERPL-MCNC: 48 MG/DL
HIV 1+2 AB+HIV1 P24 AG SERPL QL IA: NONREACTIVE
LDLC SERPL CALC-MCNC: 137 MG/DL
NONHDLC SERPL-MCNC: 158 MG/DL
TRIGL SERPL-MCNC: 103 MG/DL

## 2019-03-26 PROCEDURE — 80061 LIPID PANEL: CPT | Performed by: FAMILY MEDICINE

## 2019-03-26 PROCEDURE — 87389 HIV-1 AG W/HIV-1&-2 AB AG IA: CPT | Performed by: FAMILY MEDICINE

## 2019-03-26 PROCEDURE — 82947 ASSAY GLUCOSE BLOOD QUANT: CPT | Performed by: FAMILY MEDICINE

## 2019-03-26 PROCEDURE — 36415 COLL VENOUS BLD VENIPUNCTURE: CPT | Performed by: FAMILY MEDICINE

## 2019-06-06 PROBLEM — M54.6 RIGHT-SIDED THORACIC BACK PAIN: Status: RESOLVED | Noted: 2018-11-15 | Resolved: 2019-06-06

## 2019-09-29 ENCOUNTER — HEALTH MAINTENANCE LETTER (OUTPATIENT)
Age: 49
End: 2019-09-29

## 2019-09-30 DIAGNOSIS — F41.1 GAD (GENERALIZED ANXIETY DISORDER): ICD-10-CM

## 2019-09-30 NOTE — TELEPHONE ENCOUNTER
"Requested Prescriptions   Pending Prescriptions Disp Refills     FLUoxetine (PROZAC) 20 MG capsule [Pharmacy Med Name: FLUOXETINE HCL 20 MG CAPSULE] 90 capsule 1     Sig: TAKE 1 CAPSULE BY MOUTH EVERY DAY       SSRIs Protocol Passed - 9/30/2019  1:40 AM        Passed - Recent (12 mo) or future (30 days) visit within the authorizing provider's specialty     Patient has had an office visit with the authorizing provider or a provider within the authorizing providers department within the previous 12 mos or has a future within next 30 days. See \"Patient Info\" tab in inbasket, or \"Choose Columns\" in Meds & Orders section of the refill encounter.              Passed - Medication is active on med list        Passed - Patient is age 18 or older        FLUoxetine (PROZAC) 20 MG capsule 90 capsule 1 3/12/2019       Last Written Prescription Date:  3/12/2019  Last Fill Quantity: 90,  # refills: 1   Last office visit: 3/12/2019 with prescribing provider:  Dr. Bland   Future Office Visit:  Unknown     "

## 2020-04-23 ENCOUNTER — VIRTUAL VISIT (OUTPATIENT)
Dept: FAMILY MEDICINE | Facility: CLINIC | Age: 50
End: 2020-04-23
Payer: COMMERCIAL

## 2020-04-23 DIAGNOSIS — F41.1 GAD (GENERALIZED ANXIETY DISORDER): ICD-10-CM

## 2020-04-23 PROCEDURE — 99213 OFFICE O/P EST LOW 20 MIN: CPT | Mod: 95 | Performed by: INTERNAL MEDICINE

## 2020-04-23 NOTE — PROGRESS NOTES
"Black Benton is a 49 year old male who is being evaluated via a billable telephone visit.      The patient has been notified of following:     \"This telephone visit will be conducted via a call between you and your physician/provider. We have found that certain health care needs can be provided without the need for a physical exam.  This service lets us provide the care you need with a short phone conversation.  If a prescription is necessary we can send it directly to your pharmacy.  If lab work is needed we can place an order for that and you can then stop by our lab to have the test done at a later time.    Telephone visits are billed at different rates depending on your insurance coverage. During this emergency period, for some insurers they may be billed the same as an in-person visit.  Please reach out to your insurance provider with any questions.    If during the course of the call the physician/provider feels a telephone visit is not appropriate, you will not be charged for this service.\"    Patient has given verbal consent for Telephone visit?  Yes    How would you like to obtain your AVS? Ratnahart    Sadaf     Black Benton is a 49 year old male who presents to clinic today for the following health issues:    HPI     Medication Followup of Fluoxetine     Taking Medication as prescribed: yes    Side Effects:  None    Medication Helping Symptoms:  yes     Anxiety Follow-Up    How are you doing with your anxiety since your last visit? No change    Are you having other symptoms that might be associated with anxiety? No    Have you had a significant life event? No     Are you feeling depressed? No    Do you have any concerns with your use of alcohol or other drugs? No     How many servings of fruits and vegetables do you eat daily?  2-3    On average, how many sweetened beverages do you drink each day (Examples: soda, juice, sweet tea, etc.  Do NOT count diet or artificially sweetened beverages)?  "  0    How many days per week do you exercise enough to make your heart beat faster? 3 or less    How many minutes a day do you exercise enough to make your heart beat faster? 30 - 60    How many days per week do you miss taking your medication? 0    I spoke with Richard via telephone visit due to COVID 19 restrictions.  He is taking fluoxetine 20mg daily.  His mood and anxiety have actually been quite good even during the coronavirus pandemic.  He is working from home, which is going well.  Getting out for long walks a few days a week and weight lifting at home to keep active.  No thoughts of self harm.  Not currently seeing a counselor, doesn't feel that is necessary.     His son is turning 18 tomorrow.  It has been hard for him missing all the senior year things and having to do distance learning. They are getting a DQ cake and maybe going on a bike ride to celebrate.   Working from home.         Reviewed and updated as needed this visit by Provider  Meds         Review of Systems   ROS COMP: const, psych reviewed,  otherwise negative unless noted above.         Objective   Reported vitals:  There were no vitals taken for this visit.   healthy, alert and no distress  PSYCH: Alert and oriented times 3; coherent speech, normal   rate and volume, able to articulate logical thoughts, able   to abstract reason, no tangential thoughts, no hallucinations   or delusions  His affect is normal  RESP: No cough, no audible wheezing, able to talk in full sentences  Remainder of exam unable to be completed due to telephone visits    Diagnostic Test Results:  Labs reviewed in Epic        Assessment/Plan:  1. ALEXIA (generalized anxiety disorder)  Symptoms well controlled on fluoxetine.  Refills ordered.   - FLUoxetine (PROZAC) 20 MG capsule; TAKE 1 CAPSULE BY MOUTH EVERY DAY  Dispense: 90 capsule; Refill: 1    Return in about 6 months (around 10/23/2020) for Physical Exam.      Phone call duration:  5 minutes      Ekaterina Mccray  MD

## 2020-06-24 ENCOUNTER — OFFICE VISIT (OUTPATIENT)
Dept: FAMILY MEDICINE | Facility: CLINIC | Age: 50
End: 2020-06-24
Payer: COMMERCIAL

## 2020-06-24 VITALS
OXYGEN SATURATION: 100 % | TEMPERATURE: 98.7 F | SYSTOLIC BLOOD PRESSURE: 134 MMHG | HEART RATE: 92 BPM | BODY MASS INDEX: 34.46 KG/M2 | DIASTOLIC BLOOD PRESSURE: 74 MMHG | WEIGHT: 217 LBS

## 2020-06-24 DIAGNOSIS — L98.9 SKIN LESION: Primary | ICD-10-CM

## 2020-06-24 PROCEDURE — 99213 OFFICE O/P EST LOW 20 MIN: CPT | Performed by: NURSE PRACTITIONER

## 2020-06-24 RX ORDER — OMEGA-3/DHA/EPA/FISH OIL 60 MG-90MG
CAPSULE ORAL
COMMUNITY

## 2020-06-24 ASSESSMENT — ANXIETY QUESTIONNAIRES
7. FEELING AFRAID AS IF SOMETHING AWFUL MIGHT HAPPEN: NOT AT ALL
GAD7 TOTAL SCORE: 0
5. BEING SO RESTLESS THAT IT IS HARD TO SIT STILL: NOT AT ALL
3. WORRYING TOO MUCH ABOUT DIFFERENT THINGS: NOT AT ALL
6. BECOMING EASILY ANNOYED OR IRRITABLE: NOT AT ALL
2. NOT BEING ABLE TO STOP OR CONTROL WORRYING: NOT AT ALL
1. FEELING NERVOUS, ANXIOUS, OR ON EDGE: NOT AT ALL

## 2020-06-24 ASSESSMENT — PATIENT HEALTH QUESTIONNAIRE - PHQ9
5. POOR APPETITE OR OVEREATING: NOT AT ALL
SUM OF ALL RESPONSES TO PHQ QUESTIONS 1-9: 1

## 2020-06-24 NOTE — PROGRESS NOTES
Subjective     Black Benton is a 49 year old male who presents to clinic today for the following health issues:      Concern - Pt states he has had a scab on his back for over a month that is not healing and sometimes bleeds. Upper mid back.   Therapies Tried and outcome: abx cream     HPI: Black presents today with the complaint of unhealing scab / sore over his upper back. He noticed this about one month ago. It has occasionally bled when disturbed / scratched. No itch or pain. Has had a number of blistering sunburns in the past. Located over right upper back. 2-3 mm across. Dry. Scaly.      Patient Active Problem List   Diagnosis     ALEXIA (generalized anxiety disorder)     Class 1 obesity due to excess calories with serious comorbidity and body mass index (BMI) of 32.0 to 32.9 in adult     Benign essential hypertension     Thoracic segment dysfunction     Acute right-sided thoracic back pain     Cervical segment dysfunction     Cervicalgia     CARDIOVASCULAR SCREENING; LDL GOAL LESS THAN 160     Past Surgical History:   Procedure Laterality Date     APPENDECTOMY      at age 17      HERNIA REPAIR, INGUINAL RT/LT      multiple , age  mid 32      TONSILLECTOMY      at age 19        Social History     Tobacco Use     Smoking status: Never Smoker     Smokeless tobacco: Never Used   Substance Use Topics     Alcohol use: Yes     Family History   Problem Relation Age of Onset     Hypertension Father      Anxiety Disorder Maternal Grandmother      Depression Maternal Grandmother      Diabetes No family hx of      Coronary Artery Disease No family hx of      Cerebrovascular Disease No family hx of      Hyperlipidemia No family hx of      Colon Cancer No family hx of      Prostate Cancer No family hx of            Reviewed and updated as needed this visit by Provider  Tobacco  Allergies  Meds  Problems  Med Hx  Surg Hx  Fam Hx         Review of Systems   Constitutional, skin systems are negative, except as  otherwise noted.      Objective    /74   Pulse 92   Temp 98.7  F (37.1  C) (Tympanic)   Wt 98.4 kg (217 lb)   SpO2 100%   BMI 34.46 kg/m    Body mass index is 34.46 kg/m .  Physical Exam   GENERAL: healthy, alert and no distress  SKIN: Right upper back - 2-3 mm in diameter, erythematous, scaly, papule. No bleeding or drainage.   NEURO: Normal strength and tone, mentation intact and speech normal    Diagnostic Test Results:  Labs reviewed in Epic        Assessment & Plan     Black was seen today for derm problem.    Diagnoses and all orders for this visit:    Skin lesion  Comment: Unlikely to represent malignant melanoma. Could be BCC or SCC, or could be atypical nevus. Will send to skin clinic for further workup and likely excision / biopsy. Will follow up as needed.     -     SKIN CARE REFERRAL        See Patient Instructions    Return in about 4 weeks (around 7/22/2020) for persistent or worsening symptoms.    Edouard Encarnacion NP  [facility] – [place]

## 2020-06-25 ASSESSMENT — ANXIETY QUESTIONNAIRES: GAD7 TOTAL SCORE: 0

## 2020-07-02 ENCOUNTER — OFFICE VISIT (OUTPATIENT)
Dept: FAMILY MEDICINE | Facility: CLINIC | Age: 50
End: 2020-07-02
Payer: COMMERCIAL

## 2020-07-02 VITALS — SYSTOLIC BLOOD PRESSURE: 138 MMHG | DIASTOLIC BLOOD PRESSURE: 86 MMHG

## 2020-07-02 DIAGNOSIS — L82.1 SEBORRHEIC KERATOSES: ICD-10-CM

## 2020-07-02 DIAGNOSIS — L81.4 LENTIGINES: ICD-10-CM

## 2020-07-02 DIAGNOSIS — D18.01 CHERRY ANGIOMA: ICD-10-CM

## 2020-07-02 DIAGNOSIS — D22.9 MULTIPLE BENIGN NEVI: ICD-10-CM

## 2020-07-02 DIAGNOSIS — D48.5 NEOPLASM OF UNCERTAIN BEHAVIOR OF SKIN: Primary | ICD-10-CM

## 2020-07-02 DIAGNOSIS — D23.9 DERMATOFIBROMA: ICD-10-CM

## 2020-07-02 PROCEDURE — 88305 TISSUE EXAM BY PATHOLOGIST: CPT | Mod: TC | Performed by: PHYSICIAN ASSISTANT

## 2020-07-02 PROCEDURE — 99243 OFF/OP CNSLTJ NEW/EST LOW 30: CPT | Mod: 25 | Performed by: PHYSICIAN ASSISTANT

## 2020-07-02 PROCEDURE — 11102 TANGNTL BX SKIN SINGLE LES: CPT | Performed by: PHYSICIAN ASSISTANT

## 2020-07-02 NOTE — PATIENT INSTRUCTIONS
Proper skin care from [facility]:    -Eliminate harsh soaps as they strip the natural oils from the skin, often resulting in dry itchy skin ( i.e. Dial, Zest, Starr Spring)  -Use mild soaps such as Cetaphil or Dove Sensitive Skin in the shower. You do not need to use soap on arms, legs, and trunk every time you shower unless visibly soiled.   -Avoid hot or cold showers.  -After showering, lightly dry off and apply moisturizing within 2-3 minutes. This will help trap moisture in the skin.   -Aggressive use of a moisturizer at least 1-2 times a day to the entire body (including -Vanicream, Cetaphil, Aquaphor or Cerave) and moisturize hands after every washing.  -We recommend using moisturizers that come in a tub that needs to be scooped out, not a pump. This has more of an oil base. It will hold moisture in your skin much better than a water base moisturizer. The above recommended are non-pore clogging.      Wear a sunscreen with at least SPF 30 on your face, ears, neck and V of the chest daily. Wear sunscreen on other areas of the body if those areas are exposed to the sun throughout the day. Sunscreens can contain physical and/or chemical blockers. Physical blockers are less likely to clog pores, these include zinc oxide and titanium dioxide. Reapply every two hour and after swimming. Sunscreen examples include Neutrogena, CeraVe, Blue Lizard, Elta MD and many others.    UV radiation  UVA radiation remains constant throughout the day and throughout the year. It is a longer wavelength than UVB and therefore penetrates deeper into the skin leading to immediate and delayed tanning, photoaging, and skin cancer. 70-80% of UVA and UVB radiation occurs between the hours of 10am-2pm.  UVB radiation  UVB radiation causes the most harmful effects and is more significant during the summer months. However, snow and ice can reflect UVB radiation leading to skin damage during the winter months as well. UVB radiation is  responsible for tanning, burning, inflammation, delayed erythema (pinkness), pigmentation (brown spots), and skin cancer.                  Wound Care Instructions     FOR SUPERFICIAL WOUNDS     [facility] [phone]    [facility] [phone]          AFTER 24 HOURS YOU SHOULD REMOVE THE BANDAGE AND BEGIN DAILY DRESSING CHANGES AS FOLLOWS:     1) Remove Dressing.     2) Clean and dry the area with tap water using a Q-tip or sterile gauze pad.     3) Apply Vaseline, Aquaphor, Polysporin ointment or Bacitracin ointment over entire wound.  Do NOT use Neosporin ointment.     4) Cover the wound with a band-aid, or a sterile non-stick gauze pad and micropore paper tape      REPEAT THESE INSTRUCTIONS AT LEAST ONCE A DAY UNTIL THE WOUND HAS COMPLETELY HEALED.    It is an old wives tale that a wound heals better when it is exposed to air and allowed to dry out. The wound will heal faster with a better cosmetic result if it is kept moist with ointment and covered with a bandage.    **Do not let the wound dry out.**      Supplies Needed:      *Cotton tipped applicators (Q-tips)    *Polysporin Ointment or Bacitracin Ointment (NOT NEOSPORIN)    *Band-aids or non-stick gauze pads and micropore paper tape.      PATIENT INFORMATION:    During the healing process you will notice a number of changes. All wounds develop a small halo of redness surrounding the wound.  This means healing is occurring. Severe itching with extensive redness usually indicates sensitivity to the ointment or bandage tape used to dress the wound.  You should call our office if this develops.      Swelling  and/or discoloration around your surgical site is common, particularly when performed around the eye.    All wounds normally drain.  The larger the wound the more drainage there will be.  After 7-10 days, you will notice the wound beginning to shrink and new skin will begin to grow.  The wound is healed when you can see skin  has formed over the entire area.  A healed wound has a healthy, shiny look to the surface and is red to dark pink in color to normalize.  Wounds may take approximately 4-6 weeks to heal.  Larger wounds may take 6-8 weeks.  After the wound is healed you may discontinue dressing changes.    You may experience a sensation of tightness as your wound heals. This is normal and will gradually subside.    Your healed wound may be sensitive to temperature changes. This sensitivity improves with time, but if you re having a lot of discomfort, try to avoid temperature extremes.    Patients frequently experience itching after their wound appears to have healed because of the continue healing under the skin.  Plain Vaseline will help relieve the itching.        POSSIBLE COMPLICATIONS    BLEEDIN. Leave the bandage in place.  2. Use tightly rolled up gauze or a cloth to apply direct pressure over the bandage for 30  minutes.  3. Reapply pressure for an additional 30 minutes if necessary  4. Use additional gauze and tape to maintain pressure once the bleeding has stopped.

## 2020-07-02 NOTE — LETTER
7/2/2020         RE: Black Benton  [address]   [place] [address]        Dear Colleague,    Thank you for referring your patient, Black Benton, to the [facility]. Please see a copy of my visit note below.    HPI:  I was asked to see pt by Dr. Mccray. Black Benton is a 49 year old male patient here today for growth on back .  Patient states this has been present for a while.  Patient reports the following symptoms: bleeding not healing .  Patient reports the following previous treatments: none.  Patient reports the following modifying factors: none.  Associated symptoms: none.  Patient has no other skin complaints today.  Remainder of the HPI, Meds, PMH, Allergies, FH, and SH was reviewed in chart.    Pertinent Hx:   No personal or family history of skin cancer    History reviewed. No pertinent past medical history.    Past Surgical History:   Procedure Laterality Date     APPENDECTOMY      at age 17      HERNIA REPAIR, INGUINAL RT/LT      multiple , age  mid 32      TONSILLECTOMY      at age 19         Family History   Problem Relation Age of Onset     Hypertension Father      Anxiety Disorder Maternal Grandmother      Depression Maternal Grandmother      Diabetes No family hx of      Coronary Artery Disease No family hx of      Cerebrovascular Disease No family hx of      Hyperlipidemia No family hx of      Colon Cancer No family hx of      Prostate Cancer No family hx of        Social History     Socioeconomic History     Marital status:      Spouse name: Not on file     Number of children: Not on file     Years of education: Not on file     Highest education level: Not on file   Occupational History     Not on file   Social Needs     Financial resource strain: Not on file     Food insecurity     Worry: Not on file     Inability: Not on file     Transportation needs     Medical: Not on file     Non-medical: Not on file   Tobacco Use     Smoking status: Never  "Smoker     Smokeless tobacco: Never Used   Substance and Sexual Activity     Alcohol use: Yes     Drug use: No     Sexual activity: Yes     Partners: Female   Lifestyle     Physical activity     Days per week: Not on file     Minutes per session: Not on file     Stress: Not on file   Relationships     Social connections     Talks on phone: Not on file     Gets together: Not on file     Attends [employer] service: Not on file     Active member of club or organization: Not on file     Attends meetings of clubs or organizations: Not on file     Relationship status: Not on file     Intimate partner violence     Fear of current or ex partner: Not on file     Emotionally abused: Not on file     Physically abused: Not on file     Forced sexual activity: Not on file   Other Topics Concern     Parent/sibling w/ CABG, MI or angioplasty before 65F 55M? Not Asked   Social History Narrative    , 2 kids, non smoker social rare, working full time \" bill and collection\"        Outpatient Encounter Medications as of 7/2/2020   Medication Sig Dispense Refill     FLUoxetine (PROZAC) 20 MG capsule TAKE 1 CAPSULE BY MOUTH EVERY DAY 90 capsule 1     Omega-3 Fatty Acids (FISH OIL) 500 MG CAPS        No facility-administered encounter medications on file as of 7/2/2020.        Review Of Systems:  Skin: non healing spot  Eyes: negative  Ears/Nose/Throat: negative  Respiratory: No shortness of breath, dyspnea on exertion, cough, or hemoptysis  Cardiovascular: negative  Gastrointestinal: negative  Genitourinary: negative  Musculoskeletal: negative  Neurologic: negative  Psychiatric: negative  Hematologic/Lymphatic/Immunologic: negative  Endocrine: negative      Objective:     /86   Eyes: Conjunctivae/lids: Normal   ENT: Lips:  Normal  MSK: Normal  Cardiovascular: Peripheral edema none  Pulm: Breathing Normal  Neuro/Psych: Orientation: A/O x 3 Normal; Mood/Affect: Normal, NAD, WDWN  Pt accompanied by: self  Following areas " examined: back, scalp, left dorsal forearm, right proximal leg  Estes skin type:i   Findings:  Inflamed pink stuck-on scaly appearing papules on right upper medial back 0.4cm  Smooth brown/red macule with positive dimple sign on right leg  Brown, stuck-on scaly appearing papules on left forearm  Red smooth well-defined macules on back  Tan WD smooth macules on back  Well circumscribed macules with symmetric color distribution on back      Assessment and Plan:  1) Neoplasm of uncertain behavior on right upper medial back 0.4cm  ISK vs SCC  TANGENTIAL BIOPSY:  After consent, anesthesia with LEC and prep, tangential biopsy performed.  No complications and routine wound care.  May grow back and will get a scar. Based on lesion type may need to completely remove lesion. Patient will be notified in 7-10 days of results. Wound care directions given.    2)Dermatofibroma, lentigines, benign nevi, cherry angiomas and seborrheic keratoses    Treatment of these lesions would be purely cosmetic and not medically neccessary.  Lesion may recur and/or may not completely resolve. May need additional treatment.  Different removal options including excision, cryotherapy, cautery and /or laser.        Signs and Symptoms of non-melanoma skin cancer and ABCDEs. Patient was asked about new or changing moles/lesions on body.   Wear a sunscreen with at least SPF 30 on your face, ears, neck and V of the chest daily. Wear sunscreen on other areas of the body if those areas are exposed to the sun throughout the day. Sunscreens can contain physical and/or chemical blockers. Physical blockers are less likely to clog pores, these include zinc oxide and titanium dioxide. Reapply every two hour and after swimming. Sunscreen examples include Neutrogena, CeraVe, Blue Lizard, Elta MD and many others.      Follow up in based on biopsy results      Again, thank you for allowing me to participate in the care of your patient.         Sincerely,        Natasha Ray PA-C

## 2020-07-02 NOTE — PROGRESS NOTES
HPI:  I was asked to see pt by Dr. Mccray. Black Benton is a 49 year old male patient here today for growth on back .  Patient states this has been present for a while.  Patient reports the following symptoms: bleeding not healing .  Patient reports the following previous treatments: none.  Patient reports the following modifying factors: none.  Associated symptoms: none.  Patient has no other skin complaints today.  Remainder of the HPI, Meds, PMH, Allergies, FH, and SH was reviewed in chart.    Pertinent Hx:   No personal or family history of skin cancer    History reviewed. No pertinent past medical history.    Past Surgical History:   Procedure Laterality Date     APPENDECTOMY      at age 17      HERNIA REPAIR, INGUINAL RT/LT      multiple , age  mid 32      TONSILLECTOMY      at age 19         Family History   Problem Relation Age of Onset     Hypertension Father      Anxiety Disorder Maternal Grandmother      Depression Maternal Grandmother      Diabetes No family hx of      Coronary Artery Disease No family hx of      Cerebrovascular Disease No family hx of      Hyperlipidemia No family hx of      Colon Cancer No family hx of      Prostate Cancer No family hx of        Social History     Socioeconomic History     Marital status:      Spouse name: Not on file     Number of children: Not on file     Years of education: Not on file     Highest education level: Not on file   Occupational History     Not on file   Social Needs     Financial resource strain: Not on file     Food insecurity     Worry: Not on file     Inability: Not on file     Transportation needs     Medical: Not on file     Non-medical: Not on file   Tobacco Use     Smoking status: Never Smoker     Smokeless tobacco: Never Used   Substance and Sexual Activity     Alcohol use: Yes     Drug use: No     Sexual activity: Yes     Partners: Female   Lifestyle     Physical activity     Days per week: Not on file     Minutes per session: Not on  "file     Stress: Not on file   Relationships     Social connections     Talks on phone: Not on file     Gets together: Not on file     Attends Sikhism service: Not on file     Active member of club or organization: Not on file     Attends meetings of clubs or organizations: Not on file     Relationship status: Not on file     Intimate partner violence     Fear of current or ex partner: Not on file     Emotionally abused: Not on file     Physically abused: Not on file     Forced sexual activity: Not on file   Other Topics Concern     Parent/sibling w/ CABG, MI or angioplasty before 65F 55M? Not Asked   Social History Narrative    , 2 kids, non smoker social rare, working full time \" bill and collection\"        Outpatient Encounter Medications as of 7/2/2020   Medication Sig Dispense Refill     FLUoxetine (PROZAC) 20 MG capsule TAKE 1 CAPSULE BY MOUTH EVERY DAY 90 capsule 1     Omega-3 Fatty Acids (FISH OIL) 500 MG CAPS        No facility-administered encounter medications on file as of 7/2/2020.        Review Of Systems:  Skin: non healing spot  Eyes: negative  Ears/Nose/Throat: negative  Respiratory: No shortness of breath, dyspnea on exertion, cough, or hemoptysis  Cardiovascular: negative  Gastrointestinal: negative  Genitourinary: negative  Musculoskeletal: negative  Neurologic: negative  Psychiatric: negative  Hematologic/Lymphatic/Immunologic: negative  Endocrine: negative      Objective:     /86   Eyes: Conjunctivae/lids: Normal   ENT: Lips:  Normal  MSK: Normal  Cardiovascular: Peripheral edema none  Pulm: Breathing Normal  Neuro/Psych: Orientation: A/O x 3 Normal; Mood/Affect: Normal, NAD, WDWN  Pt accompanied by: self  Following areas examined: back, scalp, left dorsal forearm, right proximal leg  Estes skin type:i   Findings:  Inflamed pink stuck-on scaly appearing papules on right upper medial back 0.4cm  Smooth brown/red macule with positive dimple sign on right leg  Brown, stuck-on " scaly appearing papules on left forearm  Red smooth well-defined macules on back  Tan WD smooth macules on back  Well circumscribed macules with symmetric color distribution on back      Assessment and Plan:  1) Neoplasm of uncertain behavior on right upper medial back 0.4cm  ISK vs SCC  TANGENTIAL BIOPSY:  After consent, anesthesia with LEC and prep, tangential biopsy performed.  No complications and routine wound care.  May grow back and will get a scar. Based on lesion type may need to completely remove lesion. Patient will be notified in 7-10 days of results. Wound care directions given.    2)Dermatofibroma, lentigines, benign nevi, cherry angiomas and seborrheic keratoses    Treatment of these lesions would be purely cosmetic and not medically neccessary.  Lesion may recur and/or may not completely resolve. May need additional treatment.  Different removal options including excision, cryotherapy, cautery and /or laser.        Signs and Symptoms of non-melanoma skin cancer and ABCDEs. Patient was asked about new or changing moles/lesions on body.   Wear a sunscreen with at least SPF 30 on your face, ears, neck and V of the chest daily. Wear sunscreen on other areas of the body if those areas are exposed to the sun throughout the day. Sunscreens can contain physical and/or chemical blockers. Physical blockers are less likely to clog pores, these include zinc oxide and titanium dioxide. Reapply every two hour and after swimming. Sunscreen examples include Neutrogena, CeraVe, Blue Lizard, Elta MD and many others.      Follow up in based on biopsy results

## 2020-07-09 ENCOUNTER — TELEPHONE (OUTPATIENT)
Dept: FAMILY MEDICINE | Facility: CLINIC | Age: 50
End: 2020-07-09

## 2020-07-09 LAB — COPATH REPORT: NORMAL

## 2020-07-09 NOTE — TELEPHONE ENCOUNTER
----- Message from Natasha Ray PA-C sent at 7/9/2020  1:57 PM CDT -----  Skin, right upper medial back, shave:   - Consistent with involuting pyogenic granuloma ( blood vessel growth)    This is a benign lesion that does not need any additional treatment.     Please continue wound care

## 2021-01-14 ENCOUNTER — HEALTH MAINTENANCE LETTER (OUTPATIENT)
Age: 51
End: 2021-01-14

## 2021-02-09 DIAGNOSIS — F41.1 GAD (GENERALIZED ANXIETY DISORDER): ICD-10-CM

## 2021-02-10 DIAGNOSIS — F41.1 GAD (GENERALIZED ANXIETY DISORDER): ICD-10-CM

## 2021-03-02 ENCOUNTER — E-VISIT (OUTPATIENT)
Dept: FAMILY MEDICINE | Facility: CLINIC | Age: 51
End: 2021-03-02
Payer: COMMERCIAL

## 2021-03-02 DIAGNOSIS — M54.9 BACK PAIN, UNSPECIFIED BACK LOCATION, UNSPECIFIED BACK PAIN LATERALITY, UNSPECIFIED CHRONICITY: ICD-10-CM

## 2021-03-02 DIAGNOSIS — M25.50 ARTHRALGIA, UNSPECIFIED JOINT: Primary | ICD-10-CM

## 2021-03-02 PROCEDURE — 99421 OL DIG E/M SVC 5-10 MIN: CPT | Performed by: FAMILY MEDICINE

## 2021-03-03 NOTE — TELEPHONE ENCOUNTER
Provider E-Visit time total (minutes): 10 min         Kyle Onofre to hear that you are not feeling well.    So back pain and joint pain , could very well be just a muscle strain versus other potential causes.  After reviewing your chart , looks like you have had some issues with thoracic (upper) back pain couple of years ago, but no other major joint issues or problems.     Although clinically it is hard to make a judgment without examining you and investigating further.    OTC ibuprofen or Tylenol is okay to take as needed for mild symptom.  Taking triple osteo bi flex is also probably okay and safe, as some people do notice some help with joint pain.  Also gentle stretching for the joints regular walks etc. are also helpful help with sore and stiff muscles.     Although because you are having these ongoing symptoms,  I would suggest it is best for you to schedule a follow-up visit so we can discuss your symptoms in more detail and further evaluate.   Thank you for allowing me to be involved in your health care and for choosing [facility].  If you have any questions or concerns please feel free to contact me, [phone] .

## 2021-05-12 DIAGNOSIS — F41.1 GAD (GENERALIZED ANXIETY DISORDER): ICD-10-CM

## 2021-10-23 ENCOUNTER — HEALTH MAINTENANCE LETTER (OUTPATIENT)
Age: 51
End: 2021-10-23

## 2022-02-08 DIAGNOSIS — F41.1 GAD (GENERALIZED ANXIETY DISORDER): ICD-10-CM

## 2022-02-09 NOTE — TELEPHONE ENCOUNTER
Prescription approved per [facility] Refill Protocol.    Christine KRUEGER RN  EP Triage

## 2022-02-12 ENCOUNTER — HEALTH MAINTENANCE LETTER (OUTPATIENT)
Age: 52
End: 2022-02-12

## 2022-05-12 ENCOUNTER — OFFICE VISIT (OUTPATIENT)
Dept: URGENT CARE | Facility: URGENT CARE | Age: 52
End: 2022-05-12
Payer: COMMERCIAL

## 2022-05-12 ENCOUNTER — ANCILLARY PROCEDURE (OUTPATIENT)
Dept: GENERAL RADIOLOGY | Facility: CLINIC | Age: 52
End: 2022-05-12
Attending: PHYSICIAN ASSISTANT
Payer: COMMERCIAL

## 2022-05-12 VITALS
WEIGHT: 215 LBS | TEMPERATURE: 98 F | RESPIRATION RATE: 18 BRPM | DIASTOLIC BLOOD PRESSURE: 106 MMHG | OXYGEN SATURATION: 100 % | BODY MASS INDEX: 34.15 KG/M2 | SYSTOLIC BLOOD PRESSURE: 153 MMHG | HEART RATE: 122 BPM

## 2022-05-12 DIAGNOSIS — M54.42 ACUTE LEFT-SIDED LOW BACK PAIN WITH BILATERAL SCIATICA: Primary | ICD-10-CM

## 2022-05-12 DIAGNOSIS — M25.552 HIP PAIN, LEFT: ICD-10-CM

## 2022-05-12 DIAGNOSIS — M54.41 ACUTE LEFT-SIDED LOW BACK PAIN WITH BILATERAL SCIATICA: Primary | ICD-10-CM

## 2022-05-12 PROCEDURE — 99214 OFFICE O/P EST MOD 30 MIN: CPT | Performed by: PHYSICIAN ASSISTANT

## 2022-05-12 PROCEDURE — 73502 X-RAY EXAM HIP UNI 2-3 VIEWS: CPT | Mod: TC | Performed by: RADIOLOGY

## 2022-05-12 RX ORDER — CYCLOBENZAPRINE HCL 10 MG
10 TABLET ORAL 2 TIMES DAILY PRN
Qty: 14 TABLET | Refills: 0 | Status: SHIPPED | OUTPATIENT
Start: 2022-05-12 | End: 2022-05-19

## 2022-05-12 RX ORDER — METHYLPREDNISOLONE 4 MG
TABLET, DOSE PACK ORAL
Qty: 21 TABLET | Refills: 0 | Status: SHIPPED | OUTPATIENT
Start: 2022-05-12 | End: 2022-05-19

## 2022-05-12 RX ORDER — OMEGA-3 FATTY ACIDS/FISH OIL 300-1000MG
200 CAPSULE ORAL EVERY 4 HOURS PRN
COMMUNITY

## 2022-05-12 NOTE — PROGRESS NOTES
Acute left-sided low back pain with bilateral sciatica  - XR Lumbar Spine 2/3 Views  - cyclobenzaprine (FLEXERIL) 10 MG tablet; Take 1 tablet (10 mg) by mouth 2 times daily as needed for muscle spasms  - methylPREDNISolone (MEDROL DOSEPAK) 4 MG tablet therapy pack; Follow Package Directions    Hip pain, left  - XR Pelvis and Hip Left 1 View  - cyclobenzaprine (FLEXERIL) 10 MG tablet; Take 1 tablet (10 mg) by mouth 2 times daily as needed for muscle spasms  - methylPREDNISolone (MEDROL DOSEPAK) 4 MG tablet therapy pack; Follow Package Directions    Rest the affected area as much as possible.  Apply ice for 15-20 minutes intermittently as needed and especially after any offending activity. Hot packs are better for muscle spasms and cramping. Daily stretching as tolerated.  As pain recedes, begin normal activities slowly as tolerated.  Consider Physical Therapy after 6 weeks if symptoms not better with conservative care.      Okay to take acetaminophen 500 mg- 2 tabs (Total of 1000 mg) every 8 hrs   Okay to take ibuprofen 200 mg- 3 tabs (Total of 600 mg) every 6 hours      30 minutes spent on the date of the encounter doing chart review, history and exam, documentation and further activities per the note       [facility]    Subjective   51 year old who presents to clinic today for the following health issues:    Urgent Care       HPI     Pain History:  When did you first notice your pain? - Acute Pain   Where in your body do you have pain? Musculoskeletal problem/pain  Onset/Duration: Fell on Saturday on some [place] landscapes and having pain on the left lower back, left shoulder and hip  Description  Location: left low back and hip and radiating sensations going down the left leg.   Joint Swelling: no  Redness: no  Pain: YES  Warmth: no  Intensity:  moderate  Progression of Symptoms:  worsening  Accompanying signs and symptoms:   Fevers: no  Numbness/tingling/weakness:  no  History  Trauma to the area: not anytime recently   Recent illness:  no  Previous similar problem: no  Previous evaluation:  no  Precipitating or alleviating factors:  Aggravating factors include: sitting, standing, walking and climbing stairs  Therapies tried and outcome: rest/inactivity and ice    Review of Systems   Review of Systems   See HPI    Objective    Temp: 98  F (36.7  C)   BP: (!) 153/106 (patient is in pain and nervous) Pulse: (!) 122 (patient gets nervous at the doctor office)   Resp: 18 SpO2: 100 %       Physical Exam   Physical Exam  Constitutional:       General: He is not in acute distress.     Appearance: Normal appearance. He is normal weight. He is not ill-appearing, toxic-appearing or diaphoretic.   HENT:      Head: Normocephalic and atraumatic.   Musculoskeletal:      Lumbar back: Tenderness present. No swelling, deformity, lacerations or bony tenderness. Normal range of motion. Negative right straight leg raise test and negative left straight leg raise test. No scoliosis.        Back:    Neurological:      Mental Status: He is alert.   Psychiatric:         Mood and Affect: Mood normal.         Behavior: Behavior normal.         Thought Content: Thought content normal.         Judgment: Judgment normal.          Results for orders placed or performed in visit on 05/12/22 (from the past 24 hour(s))   XR Lumbar Spine 2/3 Views    Narrative    EXAM: XR LUMBAR SPINE 2-3 VIEWS  LOCATION: [facility]  DATE/TIME: 5/12/2022 5:20 PM    INDICATION:  Acute left-sided low back pain with bilateral sciatica, Acute left-sided low back pain with bilateral sciatica  COMPARISON: None.  TECHNIQUE: CR Lumbar Spine.      Impression    IMPRESSION: 5 lumbar type vertebrae. Mild levocurvature of the lumbar spine. Normal sagittal alignment. Vertebral body heights are maintained. No pars defects. The visualized bony pelvis is grossly within normal limits. Mild intervertebral disc height    loss and mild facet arthropathy at L5-S1.   XR Pelvis and Hip Left 1 View    Narrative    EXAM: XR PELVIS AND HIP LEFT 1 VIEW  LOCATION: [facility]  DATE/TIME: 5/12/2022 5:20 PM    INDICATION: Hip pain, left.    COMPARISON: None.      Impression    IMPRESSION: Both hips are negative for fracture or dislocation. Pelvis negative for fracture. Postoperative changes of herniorrhaphy.

## 2022-05-15 DIAGNOSIS — F41.1 GAD (GENERALIZED ANXIETY DISORDER): ICD-10-CM

## 2022-05-18 NOTE — TELEPHONE ENCOUNTER
Called and left message regarding visit needed and establish care.    Patient needs to be seen because it has been more than 1 year since last office visit.    Last office vist: 7/2/2020    Next office visit: none scheduled called and left message to schedule.  Also sent my chart message to schedule    aC Singh RN  [facility]

## 2022-05-19 ENCOUNTER — VIRTUAL VISIT (OUTPATIENT)
Dept: FAMILY MEDICINE | Facility: CLINIC | Age: 52
End: 2022-05-19
Payer: COMMERCIAL

## 2022-05-19 DIAGNOSIS — Z12.11 SCREEN FOR COLON CANCER: ICD-10-CM

## 2022-05-19 DIAGNOSIS — F41.1 GAD (GENERALIZED ANXIETY DISORDER): ICD-10-CM

## 2022-05-19 DIAGNOSIS — Z11.59 NEED FOR HEPATITIS C SCREENING TEST: ICD-10-CM

## 2022-05-19 PROCEDURE — 99214 OFFICE O/P EST MOD 30 MIN: CPT | Mod: 95 | Performed by: FAMILY MEDICINE

## 2022-05-19 ASSESSMENT — ANXIETY QUESTIONNAIRES
7. FEELING AFRAID AS IF SOMETHING AWFUL MIGHT HAPPEN: NOT AT ALL
6. BECOMING EASILY ANNOYED OR IRRITABLE: NOT AT ALL
IF YOU CHECKED OFF ANY PROBLEMS ON THIS QUESTIONNAIRE, HOW DIFFICULT HAVE THESE PROBLEMS MADE IT FOR YOU TO DO YOUR WORK, TAKE CARE OF THINGS AT HOME, OR GET ALONG WITH OTHER PEOPLE: NOT DIFFICULT AT ALL
GAD7 TOTAL SCORE: 0
GAD7 TOTAL SCORE: 0
2. NOT BEING ABLE TO STOP OR CONTROL WORRYING: NOT AT ALL
1. FEELING NERVOUS, ANXIOUS, OR ON EDGE: NOT AT ALL
3. WORRYING TOO MUCH ABOUT DIFFERENT THINGS: NOT AT ALL
5. BEING SO RESTLESS THAT IT IS HARD TO SIT STILL: NOT AT ALL

## 2022-05-19 ASSESSMENT — PATIENT HEALTH QUESTIONNAIRE - PHQ9
5. POOR APPETITE OR OVEREATING: NOT AT ALL
SUM OF ALL RESPONSES TO PHQ QUESTIONS 1-9: 0

## 2022-05-19 NOTE — PROGRESS NOTES
Richard is a 51 year old who is being evaluated via a billable video visit.      How would you like to obtain your AVS? MyChart  If the video visit is dropped, the invitation should be resent by: Text to cell phone: [phone]  Will anyone else be joining your video visit? No      Assessment & Plan     Screen for colon cancer    - Adult Gastro Ref - Procedure Only; Future    Need for hepatitis C screening test      ALEXIA (generalized anxiety disorder)  Patient has underlying generalized anxiety he is taking paroxetine would like to continue that medication refill.  - FLUoxetine (PROZAC) 20 MG capsule; TAKE 1 CAPSULE BY MOUTH EVERY DAY    6}  Patient had a fall few weeks ago has some pain in the leg which has been ongoing.  He is planning to see if is not improved in the next few weeks.      No follow-ups on file.    Rangel Lee MD  [facility]    Sadaf Ramos is a 51 year old who presents for the following health issuesHPI     Anxiety Follow-Up    How are you doing with your anxiety since your last visit? Improved     Are you having other symptoms that might be associated with anxiety? No    Have you had a significant life event? OTHER: Job promotion     Are you feeling depressed? No    Do you have any concerns with your use of alcohol or other drugs? No    Social History     Tobacco Use     Smoking status: Never Smoker     Smokeless tobacco: Never Used   Substance Use Topics     Alcohol use: Yes     Drug use: No     ALEXIA-7 SCORE 2/19/2018 6/24/2020 5/19/2022   Total Score 1 0 0     PHQ 3/12/2019 6/24/2020 5/19/2022   PHQ-9 Total Score - 1 0   Q9: Thoughts of better off dead/self-harm past 2 weeks Not at all Not at all Not at all     Last PHQ-9 5/19/2022   1.  Little interest or pleasure in doing things 0   2.  Feeling down, depressed, or hopeless 0   3.  Trouble falling or staying asleep, or sleeping too much 0   4.  Feeling tired or having little energy 0   5.  Poor appetite or overeating  0   6.  Feeling bad about yourself 0   7.  Trouble concentrating 0   8.  Moving slowly or restless 0   Q9: Thoughts of better off dead/self-harm past 2 weeks 0   PHQ-9 Total Score 0   Difficulty at work, home, or with people Not difficult at all         How many servings of fruits and vegetables do you eat daily?  2-3    On average, how many sweetened beverages do you drink each day (Examples: soda, juice, sweet tea, etc.  Do NOT count diet or artificially sweetened beverages)?   0    How many days per week do you exercise enough to make your heart beat faster? 3 or less    How many minutes a day do you exercise enough to make your heart beat faster? 9 or less    How many days per week do you miss taking your medication? 0        Review of Systems   Constitutional, HEENT, cardiovascular, pulmonary, gi and gu systems are negative, except as otherwise noted.      Objective           Vitals:  No vitals were obtained today due to virtual visit.    Physical Exam   GENERAL: Healthy, alert and no distress  EYES: Eyes grossly normal to inspection.  No discharge or erythema, or obvious scleral/conjunctival abnormalities.  RESP: No audible wheeze, cough, or visible cyanosis.  No visible retractions or increased work of breathing.    SKIN: Visible skin clear. No significant rash, abnormal pigmentation or lesions.  NEURO: Cranial nerves grossly intact.  Mentation and speech appropriate for age.  PSYCH: Mentation appears normal, affect normal/bright, judgement and insight intact, normal speech and appearance well-groomed.            Video-Visit Details    Type of service:  Video Visit    Video End Time:5:45  Start time:5:35  Originating Location (pt. Location): Home    Distant Location (provider location):  [facility]     Platform used for Video Visit: [employer]

## 2022-05-23 NOTE — TELEPHONE ENCOUNTER
Denied request, duplicate. Rx just sent to pharmacy on 5/19/22.   Antoinette ARCHER RN  [facility]

## 2022-06-01 ENCOUNTER — OFFICE VISIT (OUTPATIENT)
Dept: FAMILY MEDICINE | Facility: CLINIC | Age: 52
End: 2022-06-01
Payer: COMMERCIAL

## 2022-06-01 VITALS
RESPIRATION RATE: 20 BRPM | DIASTOLIC BLOOD PRESSURE: 92 MMHG | BODY MASS INDEX: 33.94 KG/M2 | OXYGEN SATURATION: 100 % | TEMPERATURE: 97.3 F | WEIGHT: 213.7 LBS | HEART RATE: 115 BPM | SYSTOLIC BLOOD PRESSURE: 136 MMHG

## 2022-06-01 DIAGNOSIS — M54.42 ACUTE LEFT-SIDED LOW BACK PAIN WITH LEFT-SIDED SCIATICA: Primary | ICD-10-CM

## 2022-06-01 PROCEDURE — 99214 OFFICE O/P EST MOD 30 MIN: CPT | Performed by: INTERNAL MEDICINE

## 2022-06-01 RX ORDER — DICLOFENAC SODIUM 75 MG/1
75 TABLET, DELAYED RELEASE ORAL 2 TIMES DAILY
Qty: 30 TABLET | Refills: 0 | Status: SHIPPED | OUTPATIENT
Start: 2022-06-01 | End: 2022-10-11

## 2022-06-01 ASSESSMENT — PAIN SCALES - GENERAL: PAINLEVEL: MODERATE PAIN (5)

## 2022-06-01 NOTE — PROGRESS NOTES
Assessment & Plan     Acute left-sided low back pain with left-sided sciatica  [facility] imaging and note reviewed.  He has s/sx consistent with bulged disc at L4-L5 or L5-S1.  Will start NSAID.  Risks and SE discussed.  Add PT.  Return precautions discussed. We both agree that this will take some additional time.    - diclofenac (VOLTAREN) 75 MG EC tablet  Dispense: 30 tablet; Refill: 0  - Physical Therapy Referral    No follow-ups on file.    Eddy Kate MD  [facility] AMARA Ramos is a 51 year old who presents for the following health issues   Patient is new to me.    Took a fall while stepping over a landscape rock and fell on left side.  Seen at  on 5/12    Notes persistent left sided low back pain.  He admits that it is gradually improving.  For example not waking up with pain anymore.  However he had LLE radicular symptoms.  This is the concern.      No prior trauma or injury  No saddle sx or GI/ changes reported.      History of Present Illness       Reason for visit:  Left leg hurts still in calf from a fall 3.5 weeks ago and back lower left tender and stiff with some pain  Symptom onset:  3-4 weeks ago  Symptoms include:  Pain, stiff, tightness,sore  Symptom intensity:  Moderate  Symptom progression:  Staying the same  Had these symptoms before:  No  What makes it worse:  Sitting  What makes it better:  Ibuprofen    He eats 2-3 servings of fruits and vegetables daily.He consumes 1 sweetened beverage(s) daily.He exercises with enough effort to increase his heart rate 20 to 29 minutes per day.  He exercises with enough effort to increase his heart rate 3 or less days per week.   He is taking medications regularly.             Review of Systems   As above      Objective    BP (!) 136/92   Pulse 115   Temp 97.3  F (36.3  C) (Temporal)   Resp 20   Wt 96.9 kg (213 lb 11.2 oz)   SpO2 100%   BMI 33.94 kg/m    Body mass index is 33.94 kg/m .  Physical Exam   GEN  NAD  MS:  Straight leg negative.  No pain with internal/external hip rotation.

## 2022-06-01 NOTE — PATIENT INSTRUCTIONS
START:    DICLOFENAC:  Take it twice a day WITH food.  It is a non steroidal anti inflammatory (NSAID).  While taking this you should not take over the counter medications containing NSAIDs (such as Ibuprofen or Naproxen).    If you develop an upset stomach or other concerning side effects please stop the medication and call us.      We also placed a referral for physical therapy.

## 2022-06-27 ENCOUNTER — VIRTUAL VISIT (OUTPATIENT)
Dept: URGENT CARE | Facility: CLINIC | Age: 52
End: 2022-06-27
Payer: COMMERCIAL

## 2022-06-27 DIAGNOSIS — U07.1 CLINICAL DIAGNOSIS OF COVID-19: Primary | ICD-10-CM

## 2022-06-27 PROCEDURE — 99213 OFFICE O/P EST LOW 20 MIN: CPT | Mod: CS

## 2022-06-27 NOTE — PROGRESS NOTES
"Richard is a 51 year old who is being evaluated via a billable video visit.      Tested + Friday.  Sx started Thursday 6/23.  Improving today.  + fatigue.  No SOB<    Patient is vaccinated and boosted.    How would you like to obtain your AVS? MyChart  If the video visit is dropped, the invitation should be resent by: Send to e-mail at: [email or web address]  Will anyone else be joining your video visit? No        Assessment & Plan     Clinical diagnosis of COVID-19    - nirmatrelvir and ritonavir (PAXLOVID) therapy pack; Take 3 tablets by mouth 2 times daily for 5 days Take 2 Nirmatrelvir tablets and 1 Ritonavir tablet twice daily for 5 days.[id number]}     COVID-19 positive patient.  Encounter for consideration of medication intervention. Patient does qualify for a prescription. Full discussion with patient including medication options, risks and benefits. Potential drug interactions reviewed with patient.     Treatment Planned Paxlovid RX Sent to [facility] [place]    Temporary change to home medications:  None     Estimated body mass index is 33.94 kg/m  as calculated from the following:    Height as of 3/12/19: 1.69 m (5' 6.54\").    Weight as of 6/1/22: 96.9 kg (213 lb 11.2 oz).  No results found for: GFRESTIMATED  No results found for: OBTUK99FWH    Patrizia Nichole MD  Virtual Urgent Care  [facility] VIRTUAL URGENT CARE    Subjective   Richard is a 51 year old, presenting for the following health issues:  No chief complaint on file.      HPI   Tested + Friday.  Sx started Thursday 6/23.  Improving today.  + fatigue.  No SOB<    Patient is vaccinated and boosted.        Review of Systems   Constitutional, HEENT, cardiovascular, pulmonary, GI, , musculoskeletal, neuro, skin, endocrine and psych systems are negative, except as otherwise noted.      Objective           Vitals:  No vitals were obtained today due to virtual visit.    Physical Exam   GENERAL: Healthy, alert and no distress  EYES: Eyes grossly normal to " inspection.  No discharge or erythema, or obvious scleral/conjunctival abnormalities.  RESP: No audible wheeze, cough, or visible cyanosis.  No visible retractions or increased work of breathing.    SKIN: Visible skin clear. No significant rash, abnormal pigmentation or lesions.  NEURO: Cranial nerves grossly intact.  Mentation and speech appropriate for age.  PSYCH: Mentation appears normal, affect normal/bright, judgement and insight intact, normal speech and appearance well-groomed.                Video-Visit Details    Video Start Time: 2:58 PM    Type of service:  Video Visit    Video End Time:3:09 PM    Originating Location (pt. Location): Home    [facility]     Platform used for Video Visit: Coverity    Elsy Chin.

## 2022-07-12 ENCOUNTER — THERAPY VISIT (OUTPATIENT)
Dept: PHYSICAL THERAPY | Facility: CLINIC | Age: 52
End: 2022-07-12
Payer: COMMERCIAL

## 2022-07-12 DIAGNOSIS — M54.42 ACUTE LEFT-SIDED LOW BACK PAIN WITH LEFT-SIDED SCIATICA: ICD-10-CM

## 2022-07-12 PROCEDURE — 97110 THERAPEUTIC EXERCISES: CPT | Mod: GP | Performed by: PHYSICAL THERAPIST

## 2022-07-12 PROCEDURE — 97161 PT EVAL LOW COMPLEX 20 MIN: CPT | Mod: GP | Performed by: PHYSICAL THERAPIST

## 2022-07-12 NOTE — PROGRESS NOTES
Physical Therapy Initial Evaluation  Subjective:  The history is provided by the patient. No  was used.   Patient Health History  Black Benton being seen for Hip soreness, pain ,leg pain.     Problem began: 5/7/2022.   Problem occurred: Hard fall   Pain is reported as 3/10 on pain scale.  General health as reported by patient is fair.  Pertinent medical history includes: other. Other medical history details: Hernia surgeries.   Red flags:  None as reported by patient.  Medical allergies: none.   Surgeries include:  Other. Other surgery history details: Hernia surgeries.    Current medications:  Anti-depressants.    Current occupation is Supervisor.   Primary job tasks include:  Computer work.                  Therapist Generated HPI Evaluation         Type of problem:  Lumbar.    This is a new condition.  Condition occurred with:  A fall/slip (slipped on [place]).  Where condition occurred: at home.  Patient reports pain:  Lumbar spine left.  Pain is described as aching and is intermittent.  Pain radiates to:  Gluteals left (L medial ankle).   Since onset symptoms are gradually improving.  Associated symptoms:  Loss of motion/stiffness (initially had numbness, but that is gone). Symptoms are exacerbated by bending (going from sit to stand, sitting on couch)  and relieved by activity/movement.  Special tests included:  X-ray.    Restrictions due to condition include:  Working in normal job without restrictions.  Barriers include:  None as reported by patient.                        Objective:  System         Lumbar/SI Evaluation    Lumbar Myotomes:  Lumbar myotomes: R L5 myotome significantly improved to 4/5 after press-ups today - will continue to monitor this.  T12-L3 (Hip Flex):  Left: 5    Right: 5  L2-4 (Quads):  Left:  5    Right:  5  L4 (Ankle DF):  Left:  5    Right:  5  L5 (Great Toe Ext): Left: 2+    Right: 5   S1 (Toe Raise):  Left: 4+    Right: 5  Lumbar DTR's:   normal                                                                   [place] Lumbar Evaluation    Posture:  Sitting: poor  Standing: good  Lordosis: WNL  Lateral Shift: no  Correction of Posture: better    Movement Loss:  Flexion (Flex): min and pain  Extension (EXT): min  Side Glide R (SG R): nil  Side Glide L (SG L): nil and pain  Test Movements:        EIL: During: decreases  After: better  Mechanical Response: IncROM  Repeat EIL: During: decreases  After: better  Mechanical Response: IncROM        Conclusion: derangement  Principle of Treatment:  Posture Correction: recommended use of lumbar support whenever sitting    Extension: press-ups w/lock sag x 10-15 repetitions, every 2-3 hrs                                           ROS    Assessment/Plan:    Patient is a 52 year old male with lumbar complaints.    Patient has the following significant findings with corresponding treatment plan.                Diagnosis 1:  LBP w/R LE radiculopathy secondary to derangement  Pain -  directional preference exercise and home program  Decreased ROM/flexibility - manual therapy, therapeutic exercise and home program  Decreased strength - therapeutic exercise, therapeutic activities and home program  Impaired muscle performance - neuro re-education and home program  Decreased function - therapeutic activities and home program  Impaired posture - neuro re-education and home program    Therapy Evaluation Codes:   1) History comprised of:   Personal factors that impact the plan of care:      Time since onset of symptoms.    Comorbidity factors that impact the plan of care are:      None.     Medications impacting care: none.  2) Examination of Body Systems comprised of:   Body structures and functions that impact the plan of care:      Lumbar spine.   Activity limitations that impact the plan of care are:      Bending, Dressing, Sitting and transitional movements.  3) Clinical presentation characteristics  are:   Evolving/Changing.  4) Decision-Making    Moderate complexity using standardized patient assessment instrument and/or measureable assessment of functional outcome.  Cumulative Therapy Evaluation is: Low complexity.    Previous and current functional limitations:  (See Goal Flow Sheet for this information)    Short term and Long term goals: (See Goal Flow Sheet for this information)     Communication ability:  Patient appears to be able to clearly communicate and understand verbal and written communication and follow directions correctly.  Treatment Explanation - The following has been discussed with the patient:   RX ordered/plan of care  Anticipated outcomes  Possible risks and side effects  This patient would benefit from PT intervention to resume normal activities.   Rehab potential is good.    Frequency:  1 X week, once daily  Duration:  for 6 weeks  Discharge Plan:  Achieve all LTG.  Independent in home treatment program.  Reach maximal therapeutic benefit.    Please refer to the daily flowsheet for treatment today, total treatment time and time spent performing 1:1 timed codes.

## 2022-07-19 ENCOUNTER — THERAPY VISIT (OUTPATIENT)
Dept: PHYSICAL THERAPY | Facility: CLINIC | Age: 52
End: 2022-07-19
Payer: COMMERCIAL

## 2022-07-19 DIAGNOSIS — M54.42 ACUTE LEFT-SIDED LOW BACK PAIN WITH LEFT-SIDED SCIATICA: Primary | ICD-10-CM

## 2022-07-19 PROCEDURE — 97110 THERAPEUTIC EXERCISES: CPT | Mod: GP | Performed by: PHYSICAL THERAPIST

## 2022-07-19 PROCEDURE — 97140 MANUAL THERAPY 1/> REGIONS: CPT | Mod: GP | Performed by: PHYSICAL THERAPIST

## 2022-07-19 PROCEDURE — 97112 NEUROMUSCULAR REEDUCATION: CPT | Mod: GP | Performed by: PHYSICAL THERAPIST

## 2022-07-26 ENCOUNTER — THERAPY VISIT (OUTPATIENT)
Dept: PHYSICAL THERAPY | Facility: CLINIC | Age: 52
End: 2022-07-26
Payer: COMMERCIAL

## 2022-07-26 DIAGNOSIS — M54.42 ACUTE LEFT-SIDED LOW BACK PAIN WITH LEFT-SIDED SCIATICA: Primary | ICD-10-CM

## 2022-07-26 PROCEDURE — 97110 THERAPEUTIC EXERCISES: CPT | Mod: GP | Performed by: PHYSICAL THERAPIST

## 2022-07-26 PROCEDURE — 97140 MANUAL THERAPY 1/> REGIONS: CPT | Mod: GP | Performed by: PHYSICAL THERAPIST

## 2022-10-10 ENCOUNTER — HEALTH MAINTENANCE LETTER (OUTPATIENT)
Age: 52
End: 2022-10-10

## 2022-10-11 ENCOUNTER — OFFICE VISIT (OUTPATIENT)
Dept: INTERNAL MEDICINE | Facility: CLINIC | Age: 52
End: 2022-10-11
Payer: COMMERCIAL

## 2022-10-11 VITALS
BODY MASS INDEX: 32.19 KG/M2 | WEIGHT: 205.1 LBS | DIASTOLIC BLOOD PRESSURE: 94 MMHG | HEART RATE: 110 BPM | HEIGHT: 67 IN | TEMPERATURE: 98.3 F | OXYGEN SATURATION: 100 % | SYSTOLIC BLOOD PRESSURE: 138 MMHG

## 2022-10-11 DIAGNOSIS — R20.2 PARESTHESIA OF LEFT LEG: ICD-10-CM

## 2022-10-11 DIAGNOSIS — M54.42 CHRONIC LEFT-SIDED LOW BACK PAIN WITH LEFT-SIDED SCIATICA: ICD-10-CM

## 2022-10-11 DIAGNOSIS — M54.16 LUMBAR RADICULOPATHY: ICD-10-CM

## 2022-10-11 DIAGNOSIS — M51.16 LUMBAR DISC HERNIATION WITH RADICULOPATHY: Primary | ICD-10-CM

## 2022-10-11 DIAGNOSIS — G89.29 CHRONIC LEFT-SIDED LOW BACK PAIN WITH LEFT-SIDED SCIATICA: ICD-10-CM

## 2022-10-11 PROCEDURE — 99213 OFFICE O/P EST LOW 20 MIN: CPT | Performed by: INTERNAL MEDICINE

## 2022-10-11 NOTE — PROGRESS NOTES
"  Assessment & Plan     (M54.42,  G89.29) Chronic left-sided low back pain with left-sided sciatica  (primary encounter diagnosis)  Comment: Left lower back pain probably continue continued lumbar muscle spasm with sciatic nerve irritation.  The worsening paresthesias into the left lower leg below the knee have intensified may indicate irritation of the left L4 nerve root.  We will seek an MRI to further clarify the anatomy and determine if the patient needs any more aggressive measures such as epidural spinal injection versus eventual spinal surgery.  At a minimum resume physical therapy exercises that he learned, continue anti-inflammatory medicines.  Will refer to specialist if indicated by MRI changes.  Plan: MR Lumbar Spine w/o Contrast            (R20.2) Paresthesia of left leg  Comment: As above  Plan: MR Lumbar Spine w/o Contrast            (M54.16) Lumbar radiculopathy  Comment: As above  Plan: MR Lumbar Spine w/o Contrast                        BMI:   Estimated body mass index is 32.61 kg/m  as calculated from the following:    Height as of this encounter: 1.689 m (5' 6.5\").    Weight as of this encounter: 93 kg (205 lb 1.6 oz).           Return in about 4 weeks (around 11/8/2022) for for recheck, if the symptoms fail to improve, With your primary MD.    Haroldo Arndt MD  [facility]    Sadaf Ramos is a 52 year old, presenting for the following health issues:  Back Pain      History of Present Illness       Back Pain:  He presents for follow up of back pain. Patient's back pain is a chronic problem.  Location of back pain:  Left lower back, left buttock, left hip, left side of waist and other  Description of back pain: cramping, dull ache, sharp and stabbing  Back pain spreads: left buttocks    Since patient first noticed back pain, pain is: rapidly worsening  Does back pain interfere with his job:  Yes      He eats 2-3 servings of fruits and vegetables " daily.He consumes 0 sweetened beverage(s) daily.He exercises with enough effort to increase his heart rate 20 to 29 minutes per day.  He exercises with enough effort to increase his heart rate 3 or less days per week.   He is taking medications regularly.     Patient fell in early May 2022.  Acute left-sided back pain into the left buttock at that time.  Symptoms progressed to include down the posterior left leg into the left anterior shin top of the foot.  Seen in the urgent care given anti-inflammatories, lumbar spine x-ray showed degenerative changes, tickly in the L4 region.  Went through multiple sessions of physical therapy, and symptoms improved at least 50% after 6 to 8 weeks.  Reports the symptoms have worsened over the last 4 weeks, with sharp pains in the left lateral lumbar area, rating into the left buttock and posterior left leg.  Also has tingling sensation into the left lateral lower leg below the knee.  Notices slight amount of weakness in the left big toe, but no stumbling no imbalance.  No bowel or bladder incontinence.  Patient concerned about a more serious problem and is requesting further evaluation possibly including an MRI.    Narrative & Impression   EXAM: XR LUMBAR SPINE 2-3 VIEWS  LOCATION: [facility]RG[facility]  DATE/TIME: 5/12/2022 5:20 PM     INDICATION:  Acute left-sided low back pain with bilateral sciatica, Acute left-sided low back pain with bilateral sciatica  COMPARISON: None.  TECHNIQUE: CR Lumbar Spine.                                                                      IMPRESSION: 5 lumbar type vertebrae. Mild levocurvature of the lumbar spine. Normal sagittal alignment. Vertebral body heights are maintained. No pars defects. The visualized bony pelvis is grossly within normal limits. Mild intervertebral disc height   loss and mild facet arthropathy at L5-S1.         Has a history of previous mild lower back pain with brief intermittent self-limited episodes,  "never anything like this.    **I reviewed the information recorded in the patient's EPIC chart (including but not limited to medical history, surgical history, family history, problem list, medication list, and allergy list) and updated the information as indicated based on the patients reported information.           Review of Systems   Constitutional, HEENT, cardiovascular, pulmonary, gi and gu systems are negative, except as otherwise noted.      Objective    BP (!) 138/94   Pulse 110   Temp 98.3  F (36.8  C) (Temporal)   Ht 1.689 m (5' 6.5\")   Wt 93 kg (205 lb 1.6 oz)   SpO2 100%   BMI 32.61 kg/m    Body mass index is 32.61 kg/m .  Physical Exam   GENERAL alert and no distress  EYES:  Normal sclera,conjunctiva, EOMI  HENT: oral and posterior pharynx without lesions or erythema, facies symmetric  NECK: Neck supple. No LAD, without thyroidmegaly.  RESP: Clear to ausculation bilaterally without wheezes or crackles. Normal BS in all fields.  CV: RRR normal S1S2 without murmurs, rubs or gallops.  LYMPH: no cervical lymph adenopathy appreciated  MS: extremities- no gross deformities of the visible extremities noted,   EXT:  no lower extremity edema  PSYCH: Alert and oriented times 3; speech- coherent  SKIN:  No obvious significant skin lesions on visible portions of face  Pt appears uncomfortable, but not in severe distress.  Pain to palpation of left paraspinal lumbar muscles, muscles in spasm, palpation reproduces pain. straight leg-raises negative bilaterally.  Able to move on and off the exam table, no obsevred weakness in the feet or legs with walking, standing, or ambulation. Normal reflexes in the achilles and patellar tendons.                     "

## 2022-10-11 NOTE — PATIENT INSTRUCTIONS
*  The current back pain spreading into the legs may be a possible disc herniation or spinal stenosis affecting one of the lumbar nerve roots.     *  MRI to evaluate the lower spine.   I will contact the radiology department to contact you to arrange this Xray procedure. (if you have claustrophobia, we can try and order an open sided MRI).       --If you have not heard from me within 1-2 business days after the MRI scan, please contact me ([phone]) to review the results and get recommendations.      --If MRI reveals a specific mechanical cause for your pain (i.e. Herniated disc, spinal stenosis), we will direct you to the proper specialist for further evaluation and treatment.       --If no specific cause for the low back pain is seen on the MRI, then we will treat with pain meds, physical therapy.

## 2022-10-25 ENCOUNTER — ANCILLARY PROCEDURE (OUTPATIENT)
Dept: MRI IMAGING | Facility: CLINIC | Age: 52
End: 2022-10-25
Attending: INTERNAL MEDICINE
Payer: COMMERCIAL

## 2022-10-25 DIAGNOSIS — M54.42 CHRONIC LEFT-SIDED LOW BACK PAIN WITH LEFT-SIDED SCIATICA: ICD-10-CM

## 2022-10-25 DIAGNOSIS — G89.29 CHRONIC LEFT-SIDED LOW BACK PAIN WITH LEFT-SIDED SCIATICA: ICD-10-CM

## 2022-10-25 DIAGNOSIS — M54.16 LUMBAR RADICULOPATHY: ICD-10-CM

## 2022-10-25 DIAGNOSIS — R20.2 PARESTHESIA OF LEFT LEG: ICD-10-CM

## 2022-10-25 PROCEDURE — 72148 MRI LUMBAR SPINE W/O DYE: CPT

## 2022-11-07 ENCOUNTER — OFFICE VISIT (OUTPATIENT)
Dept: NEUROSURGERY | Facility: CLINIC | Age: 52
End: 2022-11-07
Attending: INTERNAL MEDICINE
Payer: COMMERCIAL

## 2022-11-07 VITALS — OXYGEN SATURATION: 100 % | SYSTOLIC BLOOD PRESSURE: 135 MMHG | HEART RATE: 109 BPM | DIASTOLIC BLOOD PRESSURE: 95 MMHG

## 2022-11-07 DIAGNOSIS — M51.16 LUMBAR DISC HERNIATION WITH RADICULOPATHY: ICD-10-CM

## 2022-11-07 DIAGNOSIS — G89.29 CHRONIC LEFT-SIDED LOW BACK PAIN WITH LEFT-SIDED SCIATICA: ICD-10-CM

## 2022-11-07 DIAGNOSIS — M54.42 CHRONIC LEFT-SIDED LOW BACK PAIN WITH LEFT-SIDED SCIATICA: ICD-10-CM

## 2022-11-07 DIAGNOSIS — M54.16 LUMBAR RADICULOPATHY: ICD-10-CM

## 2022-11-07 DIAGNOSIS — R29.898 WEAKNESS OF LEFT FOOT: Primary | ICD-10-CM

## 2022-11-07 DIAGNOSIS — R20.2 PARESTHESIA OF LEFT LEG: ICD-10-CM

## 2022-11-07 PROCEDURE — 99203 OFFICE O/P NEW LOW 30 MIN: CPT | Performed by: PHYSICIAN ASSISTANT

## 2022-11-07 NOTE — PROGRESS NOTES
Neurosurgery Consult    HPI    Mr. Benton is a 52-year-old male presents to clinic for evaluation of low back pain and left leg pain.  Symptoms began in May when he slipped and fell landed on his left shoulder and hip.  He tried some physical therapy with some improvement.  He has not been doing physical therapy recently.  He has not had an injection.  He recently had a lumbar MRI which demonstrated a left small disc herniation at L 4-5, impinging the left L5 nerve.  He states that his physical therapist noted weakness with extensor houses longus back in July.  He states recently his symptoms have been getting worse.    He was having more pain previously but the pain he is having now is somewhat different, he is having numbness in his left foot, in particular difficulty with hip flexion.    Medical history  Hypertension      Social history  He works for a law firm      B/P: 135/95, T: Data Unavailable, P: 109, R: Data Unavailable       Exam    Alert and oriented no acute distress  Bilateral lower extremities with 5/5 strength, with the exception of 4 out of 5 strength with ankle dorsiflexion and extensor houses longus  Reflexes 2+ patella/ankle  Negative straight leg raise bilaterally  Negative ankle clonus negative Babinski bilaterally  Lumbar spine nontender to palpation  Able to stand on heels and toes right, demonstrates some mild foot drop when walking on his heels on the left, able to walk on his toes on the left  Gait is normal    Imaging    Lumbar MRI demonstrates a left L4-5 small disc protrusion impinging on the L5 nerve in the left lateral recess.    Assessment    Left leg radiculopathy  Left foot weakness    Plan:      I recommend the patient try again physical therapy and also try an epidural steroid injection, left L5-S1 transforaminal CHARI targeting the L5 nerve.  This could also be performed left L4-5 interlaminar as well.  Patient will follow-up within 3 to 4 weeks if he is not improving.  I told  him even if his pain is improving but he is not having improvement in his weakness he should still follow-up in clinic within 4 weeks.

## 2022-11-07 NOTE — NURSING NOTE
"Black Benton is a 52 year old male who presents for:  Chief Complaint   Patient presents with     Consult     lumbar disc herniation with radiculopathy        Initial Vitals:  BP (!) 135/95   Pulse 109   SpO2 100%  Estimated body mass index is 32.61 kg/m  as calculated from the following:    Height as of 10/11/22: 5' 6.5\" (1.689 m).    Weight as of 10/11/22: 205 lb 1.6 oz (93 kg).. There is no height or weight on file to calculate BSA. BP completed using cuff size: regular  Data Unavailable    Nursing Comments:     Christa Tejada MA    "

## 2022-11-07 NOTE — LETTER
11/7/2022         RE: Black Benton  [address]  [place] [address]        Dear Colleague,    Thank you for referring your patient, Black Benton, to the [facility]. Please see a copy of my visit note below.    Neurosurgery Consult    HPI    Mr. Benton is a 52-year-old male presents to clinic for evaluation of low back pain and left leg pain.  Symptoms began in May when he slipped and fell landed on his left shoulder and hip.  He tried some physical therapy with some improvement.  He has not been doing physical therapy recently.  He has not had an injection.  He recently had a lumbar MRI which demonstrated a left small disc herniation at L 4-5, impinging the left L5 nerve.  He states that his physical therapist noted weakness with extensor houses longus back in July.  He states recently his symptoms have been getting worse.    He was having more pain previously but the pain he is having now is somewhat different, he is having numbness in his left foot, in particular difficulty with hip flexion.    Medical history  Hypertension      Social history  He works for a [employer] firm      B/P: 135/95, T: Data Unavailable, P: 109, R: Data Unavailable       Exam    Alert and oriented no acute distress  Bilateral lower extremities with 5/5 strength, with the exception of 4 out of 5 strength with ankle dorsiflexion and extensor houses longus  Reflexes 2+ patella/ankle  Negative straight leg raise bilaterally  Negative ankle clonus negative Babinski bilaterally  Lumbar spine nontender to palpation  Able to stand on heels and toes right, demonstrates some mild foot drop when walking on his heels on the left, able to walk on his toes on the left  Gait is normal    Imaging    Lumbar MRI demonstrates a left L4-5 small disc protrusion impinging on the L5 nerve in the left lateral recess.    Assessment    Left leg radiculopathy  Left foot weakness    Plan:      I recommend the  patient try again physical therapy and also try an epidural steroid injection, left L5-S1 transforaminal CHARI targeting the L5 nerve.  This could also be performed left L4-5 interlaminar as well.  Patient will follow-up within 3 to 4 weeks if he is not improving.  I told him even if his pain is improving but he is not having improvement in his weakness he should still follow-up in clinic within 4 weeks.          Again, thank you for allowing me to participate in the care of your patient.        Sincerely,        Josue Levy PA-C

## 2022-11-11 ENCOUNTER — TELEPHONE (OUTPATIENT)
Dept: PALLIATIVE MEDICINE | Facility: CLINIC | Age: 52
End: 2022-11-11

## 2022-11-11 NOTE — TELEPHONE ENCOUNTER
Screening Questions for Radiology Injections:    Injection to be done at which interventional clinic site? [facility]    Procedure ordered by     Procedure ordered? Left L5-S1 transforaminal CHARI    Transforaminal Cervical CHARI - Send to [facility] ([facility]) - No [facility] providers perform this procedure    What insurance would patient like us to bill for this procedure? [employer]    Worker's comp or MVA (motor vehicle accident) -Any injection DO NOT SCHEDULE and route to Lori Escalera.      [employer] insurance - For SI joint injections, DO NOT SCHEDULE and route to Stephanie Han.       ALL BCBS, [employer] and [employer] - DO NOT SCHEDULE and route to Stephanie Han    [employer]- facet joint injections, route to Stephanie Han    Is an  needed? No     Patient has a  home? (Review Grid) YES: ok    Any chance of pregnancy? NO   If YES, do NOT schedule and route to RN pool  - Dr. Barrera route to Xenia Manuel and PM&R Nurse  [[id number]]      Is patient actively being treated for cancer or immunocompromised? No  If YES, do NOT schedule and route to RN pool/ Dr. Barrera's Team    Does the patient have a bleeding or clotting disorder? No     If YES, okay to schedule AND route to RN nurse brian/ Dr. Barrera's Team     (For any patients with platelet count <100, RN must forward to provider)    Is patient taking any Blood Thinners OR Antiplatelet medication?  No   If hold needed, do NOT schedule, route to RN pool/ Dr. Barrera's Team    Examples:   o Blood Thinners: (Coumadin, Warfarin, Jantoven, Pradaxa, Xarelto, Eliquis, Edoxaban, Enoxaparin, Lovenox, Heparin, Arixtra, Fondaparinux or Fragmin)  o Antiplatelet Medications: (Plavix, Brilinta or Effient)     Is patient taking any aspirin products (includes Excedrin and Fiorinal)? No     If more than 325mg/day, OK to schedule; Instruct Pt to decrease to less than 325 mg for 7 days AND route to RN pool/ Dr. Barrera's Team     For CERVICAL  procedures, hold all aspirin products for 6 days.     Tell Pt that if aspirin product is not held for 6 days, the procedure WILL BE cancelled.     Any allergies to contrast dye, iodine, shellfish, or numbing and steroid medications? No    If YES, schedule and add allergy information to appointment notes AND route to the RN pool/ Dr. Barrera's Team    If CHARI and Contrast Dye / Iodine Allergy? DO NOT SCHEDULE, route to RN pool/ Dr. Barrera's Team    Allergies: Misc. sulfonamide containing compounds, Omeprazole, and Sulfa drugs     Does patient have an active infection or treated for one within the past week? No    Is patient currently taking any antibiotics or steroid medications?  No     For patients on chronic, preventative, or prophylactic antibiotics, procedures may be scheduled.     For patients on antibiotics for active or recent infection, schedule 4 days after completed.    For patients on steroid medications, schedule 4 days after completed.     Has the patient had a flu shot or any other vaccinations within the past 7 days? Yes - 11/12  If yes, explain that for the vaccine to work best they need to:       wait 1 week before and 1 week after getting any Vaccine    wait 1 week before and 2 weeks after getting Covid Vaccine #2 or BOOSTER    If patient has concerns about the timing, send to RN pool/ Dr. Barrera's Team    Does patient have an MRI/CT?  YES: MRI Include Date and Check Procedure Scheduling Grid to see if required.    Was the MRI/CT done within the last 3 years?  Yes     If no route to RN Pool/ Dr. Barrera's Team    If yes, where was the MRI/CT done? [facility]     Refer to PACS Transmissions list for approved external locations and route to RN Pool High Priority/ Dr. Swans Team    If MRI was not done at approved external location do NOT schedule and route to RN pool/ Dr. Barrera's Team      If patient has an imaging disc, the injection MAY be scheduled but patient must bring disc to appt or appt will  be cancelled.    Is patient able to transfer to a procedure table with minimal or no assistance? Yes     If no, do NOT schedule and route to RN Pool/ Dr. Barrera's Team    Procedure Specific Instructions:    If celiac plexus block, informed patient NPO for 6 hours and that it is okay to take medications with sips of water, especially blood pressure medications Not Applicable         If this is for a cervical procedure, informed patient that aspirin needs to be held for 6 days.   Not Applicable      Sedation, If Sedation is ordered for any procedure, patient must be NPO for 6 hours prior to procedure Not Applicable      If IV needed:    Do not schedule procedures requiring IV placement in the first appointment of the day or first appointment after lunch. Do NOT schedule at 0745, 0815 or 1245. ok    Instructed patient to arrive 30 minutes early for IV start if required. (Check Procedure Scheduling Grid)  Not Applicable    Reminders:    If you are started on any steroids or antibiotics between now and your appointment, you must contact us because the procedure may need to be cancelled.  Yes      As a reminder, receiving steroids can decrease your body's ability to fight infection.   Would you still like to move forward with scheduling the injection?  Yes      IV Sedation is not provided for procedures. If oral anti-anxiety medication is needed, the patient should request this from their referring provider.      Instruct patient to arrive as directed prior to the scheduled appointment time:  If IV needed 30 minutes before appointment time       For patients 85 or older we recommend having an adult stay w/ them for the remainder of the day.       If the patient is Diabetic, remind them to bring their glucometer.      Does the patient have any questions?  NO  Angela Escalera  [facility]

## 2022-12-06 ENCOUNTER — THERAPY VISIT (OUTPATIENT)
Dept: PHYSICAL THERAPY | Facility: CLINIC | Age: 52
End: 2022-12-06
Payer: COMMERCIAL

## 2022-12-06 DIAGNOSIS — G89.29 CHRONIC LEFT-SIDED LOW BACK PAIN WITH LEFT-SIDED SCIATICA: ICD-10-CM

## 2022-12-06 DIAGNOSIS — M54.42 CHRONIC LEFT-SIDED LOW BACK PAIN WITH LEFT-SIDED SCIATICA: ICD-10-CM

## 2022-12-06 PROCEDURE — 97161 PT EVAL LOW COMPLEX 20 MIN: CPT | Mod: GP | Performed by: PHYSICAL THERAPIST

## 2022-12-06 PROCEDURE — 97110 THERAPEUTIC EXERCISES: CPT | Mod: GP | Performed by: PHYSICAL THERAPIST

## 2022-12-06 NOTE — PROGRESS NOTES
[facility] Initial Evaluation -- Lumbar    Date: December 6, 2022  Black Benton is a 52 year old male with a L LB and LE condition.   Referral: neuro  Work mechanical stresses:  sitting  Employment status:  Supervisor/manager  Leisure mechanical stresses: walking  Functional disability score (SHAAN/STarT Back):    VAS score (0-10): 1/10  Patient goals/expectations:  To not have the pain, prevent it from coming back    HISTORY:    Present symptoms: arch of L foot  Pain quality (sharp/shooting/stabbing/aching/burning/cramping):  Aching   Paresthesia (yes/no):  Yes, L calf at times    Present since (onset date): 04/15/2022.     Symptoms (improving/unchanging/worsening):  improving.     Symptoms commenced as a result of: fell--slipped on landscape rock and landed on his L side   Condition occurred in the following environment:   home     Symptoms at onset (back/thigh/leg): L LBP, lateral thigh, lateral calf  Constant symptoms (back/thigh/leg): none  Intermittent symptoms (back/thigh/leg): L lateral calf numbness, ache, L LBP    Symptoms are made worse with the following: sitting 2 hours, L calf and LBP 2/10  Symptoms are made better with the following: movement    Disturbed sleep (yes/no):  no Sleeping postures (prone/sup/side R/L): sides    Previous episodes (0/1-5/6-10/11+): ongoing since 04/15/2022--gradual improvement Year of first episode: 2022    Previous history: none  Previous treatments: PT 07/2022--helpful      Specific Questions:  Cough/Sneeze/Strain (pos/neg): neg  Bowel/Bladder (normal/abnormal): normal  Gait (normal/abnormal): normal  Medications (nil/NSAIDS/analg/steroids/anticoag/other):  Other - Anti-depressants  Medical allergies:  none  General health (excellent/good/fair/poor):  fair  Pertinent medical history:  Overweight  Imaging (None/Xray/MRI/Other):  L4-5 disc herniation, L5 nerve root compression  Recent or major surgery (yes/no):  No; hx of hernia  Night pain (yes/no):  "no  Accidents (yes/no): no  Unexplained weight loss (yes/no): no  Barriers at home: no  Other red flags: no    EXAMINATION    Posture:   Sitting (good/fair/poor): fair  Standing (good/fair/poor):good  Lordosis (red/acc/normal): normal  Correction of posture (better/worse/no effect): NE--no pain sitting    Lateral Shift (right/left/nil): nil  Relevant (yes/no):  na  Other Observations: na    Neurological:    Motor deficit:  L great toe ext 4/5, PF 5-/5, all other myotomes 5/5 on L  Reflexes:  Not assessed  Sensory deficit:  normal  Dural signs:  Slump negative    Movement Loss:   Dimas Mod Min Nil Pain   Flexion    x \"tight\" L calf   Extension   x  NE   Side Gliding R   x  NE   Side Gliding L    x NE     Test Movements:   During: produces, abolishes, increases, decreases, no effect, centralizing, peripheralizing   After: better, worse, no better, no worse, no effect, centralized, peripheralized    Pretest symptoms standing:    Symptoms During Symptoms After ROM increased ROM decreased No Effect   FIS        Rep FIS        EIS        Rep EIS          Pretest symptoms lying:     Symptoms During Symptoms After ROM increased ROM decreased No Effect   ADAM        Rep ADAM        EIL W/self-OP --NE NE        Rep EIL W/self-OP--NE NE X--improved L great toe ext and calf strength       If required, pretest symptoms:    Symptoms During Symptoms After ROM increased ROM decreased No Effect   SGIS - R        Rep SGIS - R        SGIS - L        Rep SGIS - L          Static Tests:  Sitting slouched:     Sitting erect:    Standing slouched:   Standing erect:    Lying prone in extension:   Long sitting:      Other Tests: none    Provisional Classification:  Derangement - Asymmetrical, unilateral, symptoms below knee    Principle of Management:  Education:  Posture--use of lumbar roll in sitting, importance of posture   Equipment provided:  Purchased firm lumbar roll  Mechanical therapy (Y/N):  y   Extension principle:  REIL w/self-OP " x10 reps, every 2 hours, add hip oscillations to increase ROM/force  Lateral Principle:    Flexion principle:    Other:      ASSESSMENT/PLAN:    Patient is a 52 year old male with lumbar and L LE complaints.  Provisional classification of resolving derangement with directional preference for extension.  He had slightly improved L great toe extension and calf strength after repeated lumbar extension in lying and will try at home to assess effect.  He should make good progress with a consistent extension program in addition to posture and body mechanics training.        Patient has the following significant findings with corresponding treatment plan.                Diagnosis 1:  L LB and LE pain  Pain -  self management, education, directional preference exercise and home program  Decreased ROM/flexibility - manual therapy, therapeutic exercise and home program  Decreased strength - therapeutic exercise, therapeutic activities and home program  Decreased function - therapeutic activities and home program  Impaired posture - neuro re-education and home program    Cumulative Therapy Evaluation is: Low complexity.    Previous and current functional limitations:  (See Goal Flow Sheet for this information)    Short term and Long term goals: (See Goal Flow Sheet for this information)     Communication ability:  Patient appears to be able to clearly communicate and understand verbal and written communication and follow directions correctly.  Treatment Explanation - The following has been discussed with the patient:   RX ordered/plan of care  Anticipated outcomes  Possible risks and side effects  This patient would benefit from PT intervention to resume normal activities.   Rehab potential is good.    Frequency:  1 X week, once daily  Duration:  for 2 weeks tapering to 1 more visit in 2 weeks  Discharge Plan:  Achieve all LTG.  Independent in home treatment program.  Reach maximal therapeutic benefit.    Please refer to the  daily flowsheet for treatment today, total treatment time and time spent performing 1:1 timed codes.       Answers for HPI/ROS submitted by the patient on 12/5/2022  Reason for Visit:: Sciatica  When problem began:: 4/15/2022  How problem occurred:: Fall  Number scale: 0/10  General health as reported by patient: fair  Please check all that apply to your current or past medical history: overweight  Medical allergies: none, other  Surgeries: other  Other Surgery Detail: Hernia  Medications you are currently taking: anti-depressants  Occupation:: Desk work  What are your primary job tasks: computer work

## 2022-12-14 NOTE — PROGRESS NOTES
Black Gilmorelucho was seen 3 times for evaluation and treatment.  Patient did not return for further treatment and current status is unknown.  Due to short treatment duration, no objective or functional changes were made.  Please see goal flow sheet from episode noted date below and initial evaluation for further information.    Linked Episodes   Type: Episode: Status: Noted: Resolved: Last update: Updated by:   PHYSICAL THERAPY [id number] Active 7/12/2022 7/26/2022  8:41 AM Alley Osborne PT      Comments:     
decreased endurance, ambulation, stair negotiation

## 2022-12-20 ENCOUNTER — THERAPY VISIT (OUTPATIENT)
Dept: PHYSICAL THERAPY | Facility: CLINIC | Age: 52
End: 2022-12-20
Payer: COMMERCIAL

## 2022-12-20 DIAGNOSIS — G89.29 CHRONIC LEFT-SIDED LOW BACK PAIN WITH LEFT-SIDED SCIATICA: Primary | ICD-10-CM

## 2022-12-20 DIAGNOSIS — M54.42 CHRONIC LEFT-SIDED LOW BACK PAIN WITH LEFT-SIDED SCIATICA: Primary | ICD-10-CM

## 2022-12-20 PROCEDURE — 97110 THERAPEUTIC EXERCISES: CPT | Mod: GP | Performed by: PHYSICAL THERAPIST

## 2022-12-20 PROCEDURE — 97140 MANUAL THERAPY 1/> REGIONS: CPT | Mod: GP | Performed by: PHYSICAL THERAPIST

## 2023-01-10 ENCOUNTER — THERAPY VISIT (OUTPATIENT)
Dept: PHYSICAL THERAPY | Facility: CLINIC | Age: 53
End: 2023-01-10
Payer: COMMERCIAL

## 2023-01-10 DIAGNOSIS — G89.29 CHRONIC LEFT-SIDED LOW BACK PAIN WITH LEFT-SIDED SCIATICA: Primary | ICD-10-CM

## 2023-01-10 DIAGNOSIS — M54.42 CHRONIC LEFT-SIDED LOW BACK PAIN WITH LEFT-SIDED SCIATICA: Primary | ICD-10-CM

## 2023-01-10 PROCEDURE — 97140 MANUAL THERAPY 1/> REGIONS: CPT | Mod: GP | Performed by: PHYSICAL THERAPIST

## 2023-01-10 PROCEDURE — 97110 THERAPEUTIC EXERCISES: CPT | Mod: GP | Performed by: PHYSICAL THERAPIST

## 2023-01-10 NOTE — PROGRESS NOTES
Subjective:  HPI  Physical Exam                    Objective:  System    Physical Exam    General     ROS    Assessment/Plan:    DISCHARGE REPORT    Progress reporting period is from 12/06/2022 to 01/10/2023.       SUBJECTIVE  Subjective: Patient reports his L calf has been tighter recently due to snow blowing but otherwise he has been doing well.  He attributes the L calf tightness also to not doing his exercises as consistently lately.  He can sit now for unlimited amounts of time without symptoms.  He is pleased with his progress and how he is doing/feeling at this point.  Current Pain level: 0/10.     Initial Pain level: 1/10.   Changes in function:  Yes, unlimited sitting tolerance.  Adverse reaction to treatment or activity: None    OBJECTIVE  Objective: Lumbar AROM:  flexion nil loss, p. tightness L LE from hip to calf; extension min to no loss, NE; B SG nil loss, R SG produces L arch pain.  Improved extension ROM after extension mobs and exercises.  Continue REIL w/self-OP x10 reps, every 2 hours.  Added core strength without issue--to do 1x/day.     ASSESSMENT/PLAN  Patient has been seen for 3 visits with treatment focusing on lumbar extension exercises and mobilizations as well as posture and body mechanics training.  He has made good progress throughout treatment and is doing well at this time.  He has a good HEP for further reduction/abolishment of symptoms as well as maintenance and prevention and should do well continuing with this independently.    Updated problem list and treatment plan: Diagnosis 1:  L lumbar radiculopathy  Pain -  self management, education, directional preference exercise and home program  Decreased ROM/flexibility - home program  Decreased function - home program  Impaired posture - home program  STG/LTGs have been met or progress has been made towards goals:  Yes (See Goal flow sheet completed today.)  Assessment of Progress: The patient's condition is improving.  Self  Management Plans:  Patient has been instructed in a home treatment program.  Patient is independent in a home treatment program.  Patient  has been instructed in self management of symptoms.  Patient is independent in self management of symptoms.  Black continues to require the following intervention to meet STG and LTG's:  PT intervention is no longer required to meet STG/LTG.    Recommendations:  This patient is ready to be discharged from therapy and continue their home treatment program.    Please refer to the daily flowsheet for treatment today, total treatment time and time spent performing 1:1 timed codes.

## 2023-03-25 ENCOUNTER — HEALTH MAINTENANCE LETTER (OUTPATIENT)
Age: 53
End: 2023-03-25

## 2023-04-09 ASSESSMENT — ENCOUNTER SYMPTOMS
HEMATOCHEZIA: 0
NERVOUS/ANXIOUS: 0
FREQUENCY: 0
DIARRHEA: 0
CONSTIPATION: 0
FEVER: 0
ARTHRALGIAS: 0
COUGH: 0
MYALGIAS: 0
DIZZINESS: 0
CHILLS: 0
NAUSEA: 0
EYE PAIN: 0
SORE THROAT: 0
ABDOMINAL PAIN: 0
JOINT SWELLING: 0
HEMATURIA: 0
WEAKNESS: 0
HEADACHES: 0
PALPITATIONS: 0
PARESTHESIAS: 0
HEARTBURN: 0
DYSURIA: 0

## 2023-04-10 ENCOUNTER — OFFICE VISIT (OUTPATIENT)
Dept: FAMILY MEDICINE | Facility: CLINIC | Age: 53
End: 2023-04-10
Payer: COMMERCIAL

## 2023-04-10 VITALS
HEIGHT: 67 IN | DIASTOLIC BLOOD PRESSURE: 74 MMHG | SYSTOLIC BLOOD PRESSURE: 132 MMHG | OXYGEN SATURATION: 100 % | RESPIRATION RATE: 16 BRPM | BODY MASS INDEX: 33.12 KG/M2 | WEIGHT: 211 LBS | HEART RATE: 86 BPM | TEMPERATURE: 99 F

## 2023-04-10 DIAGNOSIS — Z00.00 HEALTH MAINTENANCE EXAMINATION: Primary | ICD-10-CM

## 2023-04-10 DIAGNOSIS — Z12.5 SCREENING FOR PROSTATE CANCER: ICD-10-CM

## 2023-04-10 DIAGNOSIS — Z11.59 NEED FOR HEPATITIS C SCREENING TEST: ICD-10-CM

## 2023-04-10 DIAGNOSIS — Z12.11 SCREEN FOR COLON CANCER: ICD-10-CM

## 2023-04-10 DIAGNOSIS — F41.1 GAD (GENERALIZED ANXIETY DISORDER): ICD-10-CM

## 2023-04-10 PROCEDURE — 99396 PREV VISIT EST AGE 40-64: CPT | Performed by: FAMILY MEDICINE

## 2023-04-10 ASSESSMENT — ENCOUNTER SYMPTOMS
WEAKNESS: 0
COUGH: 0
HEMATURIA: 0
HEARTBURN: 0
CONSTIPATION: 0
ABDOMINAL PAIN: 0
HEADACHES: 0
FEVER: 0
MYALGIAS: 0
ARTHRALGIAS: 0
SORE THROAT: 0
CHILLS: 0
EYE PAIN: 0
FREQUENCY: 0
NAUSEA: 0
DIARRHEA: 0
JOINT SWELLING: 0
NERVOUS/ANXIOUS: 0
PALPITATIONS: 0
DYSURIA: 0
HEMATOCHEZIA: 0
DIZZINESS: 0
PARESTHESIAS: 0

## 2023-04-10 ASSESSMENT — PAIN SCALES - GENERAL: PAINLEVEL: NO PAIN (0)

## 2023-04-10 NOTE — PROGRESS NOTES
SUBJECTIVE:   CC: Richard is an 52 year old who presents for preventative health visit.        View : No data to display.                Healthy Habits:     Getting at least 3 servings of Calcium per day:  Yes    Bi-annual eye exam:  Yes    Dental care twice a year:  Yes    Sleep apnea or symptoms of sleep apnea:  None    Diet:  Regular (no restrictions)    Frequency of exercise:  2-3 days/week    Duration of exercise:  15-30 minutes    Taking medications regularly:  Yes    Medication side effects:  None    PHQ-2 Total Score: 0    Additional concerns today:  Yes      Today's PHQ-2 Score:       4/9/2023     6:46 PM   PHQ-2 ( 1999 [employer])   Q1: Little interest or pleasure in doing things 0   Q2: Feeling down, depressed or hopeless 0   PHQ-2 Score 0   Q1: Little interest or pleasure in doing things Not at all    Not at all   Q2: Feeling down, depressed or hopeless Not at all    Not at all   PHQ-2 Score 0    0       Have you ever done Advance Care Planning? (For example, a Health Directive, POLST, or a discussion with a medical provider or your loved ones about your wishes): Yes, advance care planning is on file.    Social History     Tobacco Use     Smoking status: Never     Smokeless tobacco: Never   Vaping Use     Vaping status: Not on file   Substance Use Topics     Alcohol use: Yes             4/9/2023     6:46 PM   Alcohol Use   Prescreen: >3 drinks/day or >7 drinks/week? No       Last PSA: No results found for: PSA    Reviewed orders with patient. Reviewed health maintenance and updated orders accordingly - Yes  BP Readings from Last 3 Encounters:   04/10/23 132/74   11/07/22 (!) 135/95   10/11/22 (!) 138/94    Wt Readings from Last 3 Encounters:   04/10/23 95.7 kg (211 lb)   10/11/22 93 kg (205 lb 1.6 oz)   06/01/22 96.9 kg (213 lb 11.2 oz)                  Patient Active Problem List   Diagnosis     ALEXIA (generalized anxiety disorder)     Class 1 obesity due to excess calories with serious comorbidity and body mass  index (BMI) of 32.0 to 32.9 in adult     Benign essential hypertension     Thoracic segment dysfunction     Acute right-sided thoracic back pain     Cervical segment dysfunction     Cervicalgia     CARDIOVASCULAR SCREENING; LDL GOAL LESS THAN 160     Back pain, unspecified back location, unspecified back pain laterality, unspecified chronicity     Acute left-sided low back pain with left-sided sciatica     Chronic left-sided low back pain with left-sided sciatica     Past Surgical History:   Procedure Laterality Date     APPENDECTOMY      at age 17      HERNIA REPAIR, INGUINAL RT/LT      multiple , age  mid 32      TONSILLECTOMY      at age 19        Social History     Tobacco Use     Smoking status: Never     Smokeless tobacco: Never   Vaping Use     Vaping status: Not on file   Substance Use Topics     Alcohol use: Yes     Family History   Problem Relation Age of Onset     Hypertension Father      Anxiety Disorder Maternal Grandmother      Depression Maternal Grandmother      Diabetes No family hx of      Coronary Artery Disease No family hx of      Cerebrovascular Disease No family hx of      Hyperlipidemia No family hx of      Colon Cancer No family hx of      Prostate Cancer No family hx of          Current Outpatient Medications   Medication Sig Dispense Refill     FLUoxetine (PROZAC) 20 MG capsule TAKE 1 CAPSULE BY MOUTH EVERY DAY 90 capsule 3     Multiple Vitamin (MULTIVITAMIN ADULT PO)        Omega-3 Fatty Acids (FISH OIL) 500 MG CAPS        Acetaminophen (TYLENOL EXTRA STRENGTH PO)  (Patient not taking: Reported on 4/10/2023)       ibuprofen (ADVIL/MOTRIN) 200 MG capsule Take 200 mg by mouth every 4 hours as needed for fever (Patient not taking: Reported on 4/10/2023)       Allergies   Allergen Reactions     Misc. Sulfonamide Containing Compounds Rash     Omeprazole Rash     Sulfa Drugs Rash     Recent Labs   Lab Test 03/26/19  0840   *   HDL 48   TRIG 103        Reviewed and updated as needed this  "visit by clinical staff                  Reviewed and updated as needed this visit by Provider                 No past medical history on file.   Past Surgical History:   Procedure Laterality Date     APPENDECTOMY      at age 17      HERNIA REPAIR, INGUINAL RT/LT      multiple , age  mid 32      TONSILLECTOMY      at age 19        Review of Systems   Constitutional: Negative for chills and fever.   HENT: Negative for congestion, ear pain, hearing loss and sore throat.    Eyes: Negative for pain and visual disturbance.   Respiratory: Negative for cough.    Cardiovascular: Negative for chest pain, palpitations and peripheral edema.   Gastrointestinal: Negative for abdominal pain, constipation, diarrhea, heartburn, hematochezia and nausea.   Genitourinary: Negative for dysuria, frequency, genital sores, hematuria, impotence, penile discharge and urgency.   Musculoskeletal: Negative for arthralgias, joint swelling and myalgias.   Skin: Negative for rash.   Neurological: Negative for dizziness, weakness, headaches and paresthesias.   Psychiatric/Behavioral: Negative for mood changes. The patient is not nervous/anxious.          OBJECTIVE:   /74 (BP Location: Right arm, Patient Position: Sitting, Cuff Size: Adult Regular)   Pulse 86   Temp 99  F (37.2  C) (Temporal)   Resp 16   Ht 1.689 m (5' 6.5\")   Wt 95.7 kg (211 lb)   SpO2 100%   BMI 33.55 kg/m      Physical Exam  GENERAL: healthy, alert and no distress  EYES: Eyes grossly normal to inspection, PERRL and conjunctivae and sclerae normal  HENT: ear canals and TM's normal, nose and mouth without ulcers or lesions  NECK: no adenopathy, no asymmetry, masses, or scars and thyroid normal to palpation  RESP: lungs clear to auscultation - no rales, rhonchi or wheezes  CV: regular rate and rhythm, normal S1 S2, no S3 or S4, no murmur, click or rub, no peripheral edema and peripheral pulses strong  ABDOMEN: soft, nontender, no hepatosplenomegaly, no masses and bowel " "sounds normal  MS: no gross musculoskeletal defects noted, no edema  SKIN: no suspicious lesions or rashes  NEURO: Normal strength and tone, mentation intact and speech normal  BACK: no CVA tenderness, no paralumbar tenderness  PSYCH: mentation appears normal, affect normal/bright  LYMPH: no cervical, supraclavicular, axillary, or inguinal adenopathy        ASSESSMENT/PLAN:       ICD-10-CM    1. Health maintenance examination  Z00.00 Colonoscopy Screening  Referral     CBC with platelets     Comprehensive metabolic panel (BMP + Alb, Alk Phos, ALT, AST, Total. Bili, TP)     Lipid panel reflex to direct LDL Fasting     PSA, screen      2. ALEXIA (generalized anxiety disorder)  F41.1 FLUoxetine (PROZAC) 20 MG capsule      3. Screen for colon cancer  Z12.11 Colonoscopy Screening  Referral      4. Need for hepatitis C screening test  Z11.59 Hepatitis C Screen Reflex to HCV RNA Quant and Genotype      5. Screening for prostate cancer  Z12.5 PSA, screen          Patient has been advised of split billing requirements and indicates understanding: Yes      COUNSELING:   Reviewed preventive health counseling, as reflected in patient instructions      BMI:   Estimated body mass index is 33.55 kg/m  as calculated from the following:    Height as of this encounter: 1.689 m (5' 6.5\").    Weight as of this encounter: 95.7 kg (211 lb).   Weight management plan: Discussed healthy diet and exercise guidelines      He reports that he has never smoked. He has never used smokeless tobacco.            Kareem Michelle MD  [facility]  "

## 2023-04-14 ENCOUNTER — LAB (OUTPATIENT)
Dept: LAB | Facility: CLINIC | Age: 53
End: 2023-04-14
Payer: COMMERCIAL

## 2023-04-14 DIAGNOSIS — Z00.00 HEALTH MAINTENANCE EXAMINATION: ICD-10-CM

## 2023-04-14 DIAGNOSIS — Z11.59 NEED FOR HEPATITIS C SCREENING TEST: ICD-10-CM

## 2023-04-14 DIAGNOSIS — Z12.5 SCREENING FOR PROSTATE CANCER: ICD-10-CM

## 2023-04-14 LAB
ALBUMIN SERPL BCG-MCNC: 4.5 G/DL (ref 3.5–5.2)
ALP SERPL-CCNC: 82 U/L (ref 40–129)
ALT SERPL W P-5'-P-CCNC: 37 U/L (ref 10–50)
ANION GAP SERPL CALCULATED.3IONS-SCNC: 14 MMOL/L (ref 7–15)
AST SERPL W P-5'-P-CCNC: 37 U/L (ref 10–50)
BILIRUB SERPL-MCNC: 0.7 MG/DL
BUN SERPL-MCNC: 13.1 MG/DL (ref 6–20)
CALCIUM SERPL-MCNC: 10.2 MG/DL (ref 8.6–10)
CHLORIDE SERPL-SCNC: 102 MMOL/L (ref 98–107)
CHOLEST SERPL-MCNC: 209 MG/DL
CREAT SERPL-MCNC: 0.91 MG/DL (ref 0.67–1.17)
DEPRECATED HCO3 PLAS-SCNC: 24 MMOL/L (ref 22–29)
ERYTHROCYTE [DISTWIDTH] IN BLOOD BY AUTOMATED COUNT: 12.9 % (ref 10–15)
GFR SERPL CREATININE-BSD FRML MDRD: >90 ML/MIN/1.73M2
GLUCOSE SERPL-MCNC: 91 MG/DL (ref 70–99)
HCT VFR BLD AUTO: 45.8 % (ref 40–53)
HDLC SERPL-MCNC: 52 MG/DL
HGB BLD-MCNC: 15.9 G/DL (ref 13.3–17.7)
LDLC SERPL CALC-MCNC: 141 MG/DL
MCH RBC QN AUTO: 30 PG (ref 26.5–33)
MCHC RBC AUTO-ENTMCNC: 34.7 G/DL (ref 31.5–36.5)
MCV RBC AUTO: 86 FL (ref 78–100)
NONHDLC SERPL-MCNC: 157 MG/DL
PLATELET # BLD AUTO: 275 10E3/UL (ref 150–450)
POTASSIUM SERPL-SCNC: 4.2 MMOL/L (ref 3.4–5.3)
PROT SERPL-MCNC: 7.8 G/DL (ref 6.4–8.3)
PSA SERPL DL<=0.01 NG/ML-MCNC: 1.12 NG/ML (ref 0–3.5)
RBC # BLD AUTO: 5.3 10E6/UL (ref 4.4–5.9)
SODIUM SERPL-SCNC: 140 MMOL/L (ref 136–145)
TRIGL SERPL-MCNC: 78 MG/DL
WBC # BLD AUTO: 8.1 10E3/UL (ref 4–11)

## 2023-04-14 PROCEDURE — 36415 COLL VENOUS BLD VENIPUNCTURE: CPT

## 2023-04-14 PROCEDURE — 80053 COMPREHEN METABOLIC PANEL: CPT

## 2023-04-14 PROCEDURE — 80061 LIPID PANEL: CPT

## 2023-04-14 PROCEDURE — 86803 HEPATITIS C AB TEST: CPT

## 2023-04-14 PROCEDURE — 85027 COMPLETE CBC AUTOMATED: CPT

## 2023-04-14 PROCEDURE — G0103 PSA SCREENING: HCPCS

## 2023-04-17 LAB — HCV AB SERPL QL IA: NONREACTIVE

## 2023-05-11 DIAGNOSIS — F41.1 GAD (GENERALIZED ANXIETY DISORDER): ICD-10-CM

## 2023-05-11 NOTE — TELEPHONE ENCOUNTER
Rx refusal was not received by [facility] of [address] for fluoxetine.   [facility] Pharmacy Technician confirmed that the patient has refills on file and rx is ready for .   Justine Baker RN

## 2023-06-26 ENCOUNTER — TELEPHONE (OUTPATIENT)
Dept: FAMILY MEDICINE | Facility: CLINIC | Age: 53
End: 2023-06-26
Payer: COMMERCIAL

## 2023-06-26 NOTE — TELEPHONE ENCOUNTER
Needs of attention regarding:  -Colon Cancer Screening    Health Maintenance Topics with due status: Overdue       Topic Date Due    COLORECTAL CANCER SCREENING Never done    ANNUAL REVIEW OF HM ORDERS 05/19/2023     Health Maintenance Topics with due status: Due On       Topic Date Due    ZOSTER IMMUNIZATION 07/08/2022       Communication:  See MyChart message

## 2023-07-13 ENCOUNTER — OFFICE VISIT (OUTPATIENT)
Dept: INTERNAL MEDICINE | Facility: CLINIC | Age: 53
End: 2023-07-13
Payer: COMMERCIAL

## 2023-07-13 VITALS
OXYGEN SATURATION: 100 % | TEMPERATURE: 98.9 F | RESPIRATION RATE: 16 BRPM | SYSTOLIC BLOOD PRESSURE: 152 MMHG | HEIGHT: 67 IN | BODY MASS INDEX: 33.23 KG/M2 | DIASTOLIC BLOOD PRESSURE: 102 MMHG | WEIGHT: 211.7 LBS | HEART RATE: 98 BPM

## 2023-07-13 DIAGNOSIS — R03.0 WHITE COAT SYNDROME WITHOUT DIAGNOSIS OF HYPERTENSION: Primary | ICD-10-CM

## 2023-07-13 DIAGNOSIS — Z12.11 SCREEN FOR COLON CANCER: ICD-10-CM

## 2023-07-13 PROCEDURE — 99212 OFFICE O/P EST SF 10 MIN: CPT

## 2023-07-13 NOTE — PROGRESS NOTES
Assessment & Plan     White coat syndrome without diagnosis of hypertension  Patient has had elevated BP at [facility] physical. His blood pressure in office today is elevated at 144/102. He has brought him home BP cuff and it is accurate with our readings. His blood pressure that has been monitored on his home cuff has been less than 140/90. I believe patient has white coat syndrome that is causing elevation of BP    Letter was provided for patient with information stated above      Soren Penaloza NP  [facility] CARINA Ramos is a 53 year old, presenting for the following health issues:  Hypertension        7/13/2023    10:55 AM   Additional Questions   Roomed by Samantha Webster MA   Accompanied by Himself     History of Present Illness       Reason for visit:  High blood pressure readings  Symptom onset:  1-3 days ago  Symptoms include:  High blood pressure readings  Symptom intensity:  Moderate  Symptom progression:  Improving  Had these symptoms before:  Yes  Has tried/received treatment for these symptoms:  No  What makes it worse:  Caffeine  What makes it better:  Rest    He eats 2-3 servings of fruits and vegetables daily.He consumes 1 sweetened beverage(s) daily.He exercises with enough effort to increase his heart rate 60 or more minutes per day.  He exercises with enough effort to increase his heart rate 4 days per week.   He is taking medications regularly.       Hypertension Follow-up      Do you check your blood pressure regularly outside of the clinic? Yes    Are you following a low salt diet? Yes, just started this week Tuesday.    Are your blood pressures ever more than 140 on the top number (systolic) OR more   than 90 on the bottom number (diastolic), for example 140/90? Yes     Patient has had elevated BP at [facility] physical. His blood pressure in office today is elevated at 144/102. He has brought him home BP cuff and it is accurate with our readings. His blood pressure that  "has been monitored on his home cuff has been less than 140/90..    Review of Systems   Constitutional, HEENT, cardiovascular, pulmonary, GI, , musculoskeletal, neuro, skin, endocrine and psych systems are negative, except as otherwise noted.      Objective    Pulse 98   Temp 98.9  F (37.2  C) (Oral)   Resp 16   Ht 1.689 m (5' 6.5\")   Wt 96 kg (211 lb 11.2 oz)   SpO2 100%   BMI 33.66 kg/m    Body mass index is 33.66 kg/m .  Physical Exam   GENERAL: alert and no distress  EYES: Eyes grossly normal to inspection  HENT: Nose and mouth without ulcers or lesions  NECK: no adenopathy, no asymmetry, masses, or scars and thyroid normal to palpation  RESP: lungs clear to auscultation - no rales, rhonchi or wheezes  CV: regular rate and rhythm, normal S1 S2, no S3 or S4, no murmur, click or rub, no peripheral edema and peripheral pulses strong  MS: no gross musculoskeletal defects noted, no edema  SKIN: no suspicious lesions or rashes  NEURO: Normal strength and tone, mentation intact and speech normal  PSYCH: mentation appears normal, affect normal/bright          "

## 2023-07-13 NOTE — LETTER
July 13, 2023      Richard Benton  [address] [place] [place]  [place] [address]        To whom it may concern,     Patient was seen in clinic today.     He has brought him home BP cuff and it is accurate when compared to a manual blood pressure in our office. His blood pressure that has been monitored on his home cuff has been less than 140/90 while at home. Therefore, I believe patient has white coat hypertension that could explain his elevated blood pressure readings while in office. 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       If you have any questions or concerns, please call the clinic at the number listed above.       Sincerely,      Soren Penaloza NP

## 2023-07-14 ENCOUNTER — TELEPHONE (OUTPATIENT)
Dept: FAMILY MEDICINE | Facility: CLINIC | Age: 53
End: 2023-07-14
Payer: COMMERCIAL

## 2023-07-14 NOTE — LETTER
7/14/2023        RE: Black BURR Serenity  [address]  [place] [address]        To whom it may concern,    Black Pandya was seen in clinic on 7/13/2023 with concerns about elevated blood pressures at [facility] physicals.  Patient brought his home blood pressure machine to the clinic and showed me that his home blood pressures have been less than 140/90.  His home blood pressure cuff was checked against a manual blood pressure and it is accurate.  Patient has whitecoat hypertension.    Sincerely,        Soren Penaloza NP

## 2023-07-14 NOTE — TELEPHONE ENCOUNTER
Jose called and said the letter needs to state the patient brought his home blood pressure machine into the clinic and showed Soren his blood pressure readings.

## 2023-07-14 NOTE — TELEPHONE ENCOUNTER
Patient calling wanting to know if provider can write a new letter as the company stated that they need more information.     The letter needs to say patient is under the care of a provider with [facility] to manage his blood pressure.    Requesting this to be completed today as he has another physical on Monday.      Patient will get off of myc.

## 2023-07-14 NOTE — TELEPHONE ENCOUNTER
Left voice message for patient that Soren received his message and sent update through [employer] as requested. Advised patient to check [employer] for communication from Soren and call with any questions.     Greta Ryan RN  [facility]

## 2024-03-11 ENCOUNTER — PATIENT OUTREACH (OUTPATIENT)
Dept: CARE COORDINATION | Facility: CLINIC | Age: 54
End: 2024-03-11
Payer: COMMERCIAL

## 2024-05-25 ENCOUNTER — HEALTH MAINTENANCE LETTER (OUTPATIENT)
Age: 54
End: 2024-05-25

## 2024-05-31 ENCOUNTER — TELEPHONE (OUTPATIENT)
Dept: FAMILY MEDICINE | Facility: CLINIC | Age: 54
End: 2024-05-31
Payer: COMMERCIAL

## 2024-05-31 DIAGNOSIS — F41.1 GAD (GENERALIZED ANXIETY DISORDER): ICD-10-CM

## 2024-05-31 NOTE — LETTER
Sharron 10, 2024      Black Benton  [address]  [place] [address]        Kyle Ramos,    Our records indicate that it is time to schedule a visit with your primary care provider.  You are due to be seen for  med check/CPE and fasting lab.  We have sent to the pharmacy a 3 month refill of your medication until you can be seen by your provider.  You may call [phone] to schedule or via [employer] using the appointment tab.    If you are no longer a [facility] patient; please contact us and let us know that as well.  You will need to let the pharmacy know the name of your new provider so that they can send future refill requests to them.    Thank you       [facility] [place]

## 2024-07-25 ENCOUNTER — VIRTUAL VISIT (OUTPATIENT)
Dept: FAMILY MEDICINE | Facility: CLINIC | Age: 54
End: 2024-07-25
Payer: COMMERCIAL

## 2024-07-25 DIAGNOSIS — Z12.11 SCREEN FOR COLON CANCER: Primary | ICD-10-CM

## 2024-07-25 DIAGNOSIS — F41.1 GAD (GENERALIZED ANXIETY DISORDER): ICD-10-CM

## 2024-07-25 PROCEDURE — 99213 OFFICE O/P EST LOW 20 MIN: CPT | Mod: 95 | Performed by: FAMILY MEDICINE

## 2024-07-25 ASSESSMENT — PATIENT HEALTH QUESTIONNAIRE - PHQ9
5. POOR APPETITE OR OVEREATING: NOT AT ALL
SUM OF ALL RESPONSES TO PHQ QUESTIONS 1-9: 0

## 2024-07-25 ASSESSMENT — ANXIETY QUESTIONNAIRES
6. BECOMING EASILY ANNOYED OR IRRITABLE: NOT AT ALL
GAD7 TOTAL SCORE: 0
GAD7 TOTAL SCORE: 0
3. WORRYING TOO MUCH ABOUT DIFFERENT THINGS: NOT AT ALL
IF YOU CHECKED OFF ANY PROBLEMS ON THIS QUESTIONNAIRE, HOW DIFFICULT HAVE THESE PROBLEMS MADE IT FOR YOU TO DO YOUR WORK, TAKE CARE OF THINGS AT HOME, OR GET ALONG WITH OTHER PEOPLE: NOT DIFFICULT AT ALL
5. BEING SO RESTLESS THAT IT IS HARD TO SIT STILL: NOT AT ALL
2. NOT BEING ABLE TO STOP OR CONTROL WORRYING: NOT AT ALL
7. FEELING AFRAID AS IF SOMETHING AWFUL MIGHT HAPPEN: NOT AT ALL
1. FEELING NERVOUS, ANXIOUS, OR ON EDGE: NOT AT ALL

## 2024-07-25 NOTE — PROGRESS NOTES
Richard is a 54 year old who is being evaluated via a billable video visit.    What phone number would you like to be contacted at? [phone]  How would you like to obtain your AVS? MyChart      Assessment & Plan     Screen for colon cancer  Will discuss at CPE    ALEXIA (generalized anxiety disorder)  Has been stable, keep monitoring with current dose, and recheck in 1year   - FLUoxetine (PROZAC) 20 MG capsule; TAKE 1 CAPSULE BY MOUTH EVERY DAY            FUTURE APPOINTMENTS:       - Follow-up visit for CPE in 1-2 months     Subjective   Richard is a 54 year old, presenting for the following health issues:  Recheck Medication and  Follow Up (Anxiety )        7/25/2024     9:36 AM   Additional Questions   Roomed by Nina ALMAGUER       Medication Followup of Fluoxetine   Taking Medication as prescribed: yes  Side Effects:  None  Medication Helping Symptoms:  yes    Anxiety   How are you doing with your anxiety since your last visit? Improved   Are you having other symptoms that might be associated with anxiety? No  Have you had a significant life event? No   Are you feeling depressed? No  Do you have any concerns with your use of alcohol or other drugs? No    Social History     Tobacco Use    Smoking status: Never    Smokeless tobacco: Never   Vaping Use    Vaping status: Never Used   Substance Use Topics    Alcohol use: Yes    Drug use: No         6/24/2020     1:30 PM 5/19/2022     5:09 PM 7/25/2024     9:31 AM   ALEXIA-7 SCORE   Total Score 0 0 0         6/24/2020     1:30 PM 5/19/2022     5:09 PM 7/25/2024     9:31 AM   PHQ   PHQ-9 Total Score 1 0 0   Q9: Thoughts of better off dead/self-harm past 2 weeks Not at all Not at all Not at all         7/25/2024     9:31 AM   Last PHQ-9   1.  Little interest or pleasure in doing things 0   2.  Feeling down, depressed, or hopeless 0   3.  Trouble falling or staying asleep, or sleeping too much 0   4.  Feeling tired or having little energy 0   5.  Poor appetite or overeating 0    6.  Feeling bad about yourself 0   7.  Trouble concentrating 0   8.  Moving slowly or restless 0   Q9: Thoughts of better off dead/self-harm past 2 weeks 0   PHQ-9 Total Score 0         7/25/2024     9:31 AM   ALEXIA-7    1. Feeling nervous, anxious, or on edge 0   2. Not being able to stop or control worrying 0   3. Worrying too much about different things 0   4. Trouble relaxing 0   5. Being so restless that it is hard to sit still 0   6. Becoming easily annoyed or irritable 0   7. Feeling afraid, as if something awful might happen 0   ALEXIA-7 Total Score 0   If you checked any problems, how difficult have they made it for you to do your work, take care of things at home, or get along with other people? Not difficult at all         Review of Systems  Constitutional, HEENT, cardiovascular, pulmonary, GI, , musculoskeletal, neuro, skin, endocrine and psych systems are negative, except as otherwise noted.      Objective           Vitals:  No vitals were obtained today due to virtual visit.    Physical Exam   GENERAL: alert and no distress  EYES: Eyes grossly normal to inspection.  No discharge or erythema, or obvious scleral/conjunctival abnormalities.  RESP: No audible wheeze, cough, or visible cyanosis.    SKIN: Visible skin clear. No significant rash, abnormal pigmentation or lesions.  NEURO: Cranial nerves grossly intact.  Mentation and speech appropriate for age.  PSYCH: Appropriate affect, tone, and pace of words          Video-Visit Details    Type of service:  Video Visit   Originating Location (pt. Location): Home    Distant Location (provider location):  On-site  Platform used for Video Visit: [employer]  Signed Electronically by: Kareem Michelle MD

## 2024-10-18 ENCOUNTER — OFFICE VISIT (OUTPATIENT)
Dept: FAMILY MEDICINE | Facility: CLINIC | Age: 54
End: 2024-10-18
Payer: COMMERCIAL

## 2024-10-18 VITALS
SYSTOLIC BLOOD PRESSURE: 134 MMHG | HEART RATE: 76 BPM | RESPIRATION RATE: 16 BRPM | BODY MASS INDEX: 31.39 KG/M2 | OXYGEN SATURATION: 99 % | HEIGHT: 67 IN | DIASTOLIC BLOOD PRESSURE: 88 MMHG | WEIGHT: 200 LBS | TEMPERATURE: 97.8 F

## 2024-10-18 DIAGNOSIS — Z00.00 HEALTH MAINTENANCE EXAMINATION: Primary | ICD-10-CM

## 2024-10-18 DIAGNOSIS — Z12.11 SCREEN FOR COLON CANCER: ICD-10-CM

## 2024-10-18 DIAGNOSIS — I10 BENIGN ESSENTIAL HYPERTENSION: ICD-10-CM

## 2024-10-18 DIAGNOSIS — Z12.5 SCREENING FOR PROSTATE CANCER: ICD-10-CM

## 2024-10-18 LAB
ALT SERPL W P-5'-P-CCNC: 28 U/L (ref 0–70)
ANION GAP SERPL CALCULATED.3IONS-SCNC: 7 MMOL/L (ref 7–15)
BUN SERPL-MCNC: 16.6 MG/DL (ref 6–20)
CALCIUM SERPL-MCNC: 9.7 MG/DL (ref 8.8–10.4)
CHLORIDE SERPL-SCNC: 105 MMOL/L (ref 98–107)
CHOLEST SERPL-MCNC: 198 MG/DL
CREAT SERPL-MCNC: 0.91 MG/DL (ref 0.67–1.17)
EGFRCR SERPLBLD CKD-EPI 2021: >90 ML/MIN/1.73M2
ERYTHROCYTE [DISTWIDTH] IN BLOOD BY AUTOMATED COUNT: 12.8 % (ref 10–15)
FASTING STATUS PATIENT QL REPORTED: YES
FASTING STATUS PATIENT QL REPORTED: YES
GLUCOSE SERPL-MCNC: 105 MG/DL (ref 70–99)
HCO3 SERPL-SCNC: 28 MMOL/L (ref 22–29)
HCT VFR BLD AUTO: 44.8 % (ref 40–53)
HDLC SERPL-MCNC: 61 MG/DL
HGB BLD-MCNC: 15.3 G/DL (ref 13.3–17.7)
LDLC SERPL CALC-MCNC: 122 MG/DL
MCH RBC QN AUTO: 30.7 PG (ref 26.5–33)
MCHC RBC AUTO-ENTMCNC: 34.2 G/DL (ref 31.5–36.5)
MCV RBC AUTO: 90 FL (ref 78–100)
NONHDLC SERPL-MCNC: 137 MG/DL
PLATELET # BLD AUTO: 248 10E3/UL (ref 150–450)
POTASSIUM SERPL-SCNC: 4.6 MMOL/L (ref 3.4–5.3)
PSA SERPL DL<=0.01 NG/ML-MCNC: 1.08 NG/ML (ref 0–3.5)
RBC # BLD AUTO: 4.99 10E6/UL (ref 4.4–5.9)
SODIUM SERPL-SCNC: 140 MMOL/L (ref 135–145)
TRIGL SERPL-MCNC: 76 MG/DL
WBC # BLD AUTO: 5.2 10E3/UL (ref 4–11)

## 2024-10-18 PROCEDURE — 85027 COMPLETE CBC AUTOMATED: CPT | Performed by: FAMILY MEDICINE

## 2024-10-18 PROCEDURE — 80048 BASIC METABOLIC PNL TOTAL CA: CPT | Performed by: FAMILY MEDICINE

## 2024-10-18 PROCEDURE — 80061 LIPID PANEL: CPT | Performed by: FAMILY MEDICINE

## 2024-10-18 PROCEDURE — 99396 PREV VISIT EST AGE 40-64: CPT | Performed by: FAMILY MEDICINE

## 2024-10-18 PROCEDURE — 84460 ALANINE AMINO (ALT) (SGPT): CPT | Performed by: FAMILY MEDICINE

## 2024-10-18 PROCEDURE — 36415 COLL VENOUS BLD VENIPUNCTURE: CPT | Performed by: FAMILY MEDICINE

## 2024-10-18 PROCEDURE — G0103 PSA SCREENING: HCPCS | Performed by: FAMILY MEDICINE

## 2024-10-18 SDOH — HEALTH STABILITY: PHYSICAL HEALTH: ON AVERAGE, HOW MANY MINUTES DO YOU ENGAGE IN EXERCISE AT THIS LEVEL?: 150+ MIN

## 2024-10-18 SDOH — HEALTH STABILITY: PHYSICAL HEALTH: ON AVERAGE, HOW MANY DAYS PER WEEK DO YOU ENGAGE IN MODERATE TO STRENUOUS EXERCISE (LIKE A BRISK WALK)?: 5 DAYS

## 2024-10-18 ASSESSMENT — SOCIAL DETERMINANTS OF HEALTH (SDOH): HOW OFTEN DO YOU GET TOGETHER WITH FRIENDS OR RELATIVES?: ONCE A WEEK

## 2024-10-18 ASSESSMENT — PAIN SCALES - GENERAL: PAINLEVEL: NO PAIN (0)

## 2024-10-18 NOTE — PROGRESS NOTES
"Preventive Care Visit  [facility] [place] Michelle MD, Family Medicine  Oct 18, 2024      Assessment & Plan     Screen for colon cancer    - Colonoscopy Screening  Referral; Future    Benign essential hypertension    - BASIC METABOLIC PANEL; Future    Health maintenance examination    - Colonoscopy Screening  Referral; Future  - BASIC METABOLIC PANEL; Future  - CBC with platelets; Future  - ALT; Future  - Lipid panel reflex to direct LDL Fasting; Future  - PSA, screen; Future    Screening for prostate cancer    - PSA, screen; Future    Patient has been advised of split billing requirements and indicates understanding: Yes        BMI  Estimated body mass index is 31.58 kg/m  as calculated from the following:    Height as of this encounter: 1.695 m (5' 6.73\").    Weight as of this encounter: 90.7 kg (200 lb).   Weight management plan: Discussed healthy diet and exercise guidelines    Counseling  Appropriate preventive services were addressed with this patient via screening, questionnaire, or discussion as appropriate for fall prevention, nutrition, physical activity, Tobacco-use cessation, social engagement, weight loss and cognition.  Checklist reviewing preventive services available has been given to the patient.  Reviewed patient's diet, addressing concerns and/or questions.   He is at risk for psychosocial distress and has been provided with information to reduce risk.       FUTURE APPOINTMENTS:       - Follow-up visit in 1 year     Sadaf Ramos is a 54 year old, presenting for the following:  Physical        10/18/2024     7:17 AM   Additional Questions   Roomed by Amaal        Health Care Directive  Patient does not have a Health Care Directive or Living Will: Discussed advance care planning with patient; however, patient declined at this time.    HPI          10/18/2024   General Health   How would you rate your overall physical health? (!) FAIR   Feel stress " (tense, anxious, or unable to sleep) Only a little      (!) STRESS CONCERN      10/18/2024   Nutrition   Three or more servings of calcium each day? Yes   Diet: Regular (no restrictions)   How many servings of fruit and vegetables per day? (!) 2-3   How many sweetened beverages each day? 0-1            10/18/2024   Exercise   Days per week of moderate/strenous exercise 5 days   Average minutes spent exercising at this level 150+ min            10/18/2024   Social Factors   Frequency of gathering with friends or relatives Once a week   Worry food won't last until get money to buy more No   Food not last or not have enough money for food? No   Do you have housing? (Housing is defined as stable permanent housing and does not include staying ouside in a car, in a tent, in an abandoned building, in an overnight shelter, or couch-surfing.) Yes   Are you worried about losing your housing? No   Lack of transportation? No   Unable to get utilities (heat,electricity)? No            10/18/2024   Fall Risk   Fallen 2 or more times in the past year? No   Trouble with walking or balance? No             10/18/2024   Dental   Dentist two times every year? Yes            10/18/2024   TB Screening   Were you born outside of the [place]? No              Today's PHQ-2 Score:       7/25/2024     9:40 AM   PHQ-2 ( 1999 [employer])   Q1: Little interest or pleasure in doing things 0   Q2: Feeling down, depressed or hopeless 0   PHQ-2 Score 0         10/18/2024   Substance Use   Alcohol more than 3/day or more than 7/wk No   Do you use any other substances recreationally? No        Social History     Tobacco Use    Smoking status: Never    Smokeless tobacco: Never   Vaping Use    Vaping status: Never Used   Substance Use Topics    Alcohol use: Yes    Drug use: No           10/18/2024   STI Screening   New sexual partner(s) since last STI/HIV test? No      ASCVD Risk   The 10-year ASCVD risk score (Rupesh CLEMENT, et al., 2019) is: 4.7%     Values used to calculate the score:      Age: 54 years      Sex: Male      Is Non- : No      Diabetic: No      Tobacco smoker: No      Systolic Blood Pressure: 134 mmHg      Is BP treated: No      HDL Cholesterol: 52 mg/dL      Total Cholesterol: 209 mg/dL           Reviewed and updated as needed this visit by Provider                    No past medical history on file.  Past Surgical History:   Procedure Laterality Date    APPENDECTOMY      at age 17     BIOPSY      COLONOSCOPY  Unsure    HERNIA REPAIR, INGUINAL RT/LT      multiple , age  mid 32     TONSILLECTOMY      at age 19      Labs reviewed in EPIC  BP Readings from Last 3 Encounters:   10/18/24 134/88   07/13/23 (!) 152/102   04/10/23 132/74    Wt Readings from Last 3 Encounters:   10/18/24 90.7 kg (200 lb)   07/13/23 96 kg (211 lb 11.2 oz)   04/10/23 95.7 kg (211 lb)                  Patient Active Problem List   Diagnosis    ALEXIA (generalized anxiety disorder)    Class 1 obesity due to excess calories with serious comorbidity and body mass index (BMI) of 32.0 to 32.9 in adult    Benign essential hypertension    Thoracic segment dysfunction    Acute right-sided thoracic back pain    Cervical segment dysfunction    Cervicalgia    CARDIOVASCULAR SCREENING; LDL GOAL LESS THAN 160    Back pain, unspecified back location, unspecified back pain laterality, unspecified chronicity    Acute left-sided low back pain with left-sided sciatica    Chronic left-sided low back pain with left-sided sciatica     Past Surgical History:   Procedure Laterality Date    APPENDECTOMY      at age 17     BIOPSY      COLONOSCOPY  Unsure    HERNIA REPAIR, INGUINAL RT/LT      multiple , age  mid 32     TONSILLECTOMY      at age 19        Social History     Tobacco Use    Smoking status: Never    Smokeless tobacco: Never   Substance Use Topics    Alcohol use: Yes     Family History   Problem Relation Age of Onset    Hypertension Father     Anxiety Disorder  "Maternal Grandmother     Depression Maternal Grandmother     Diabetes No family hx of     Coronary Artery Disease No family hx of     Cerebrovascular Disease No family hx of     Hyperlipidemia No family hx of     Colon Cancer No family hx of     Prostate Cancer No family hx of     Breast Cancer No family hx of          Current Outpatient Medications   Medication Sig Dispense Refill    FLUoxetine (PROZAC) 20 MG capsule TAKE 1 CAPSULE BY MOUTH EVERY DAY 90 capsule 3    Multiple Vitamin (MULTIVITAMIN ADULT PO)       Omega-3 Fatty Acids (FISH OIL) 500 MG CAPS       Acetaminophen (TYLENOL EXTRA STRENGTH PO)  (Patient not taking: Reported on 4/10/2023)      ibuprofen (ADVIL/MOTRIN) 200 MG capsule Take 200 mg by mouth every 4 hours as needed for fever (Patient not taking: Reported on 4/10/2023)       Allergies   Allergen Reactions    Misc. Sulfonamide Containing Compounds Rash    Omeprazole Rash    Sulfa Antibiotics Rash     Recent Labs   Lab Test 04/14/23  1317 03/26/19  0840   * 137*   HDL 52 48   TRIG 78 103   ALT 37  --    CR 0.91  --    GFRESTIMATED >90  --    POTASSIUM 4.2  --           Review of Systems  Constitutional, HEENT, cardiovascular, pulmonary, GI, , musculoskeletal, neuro, skin, endocrine and psych systems are negative, except as otherwise noted.     Objective    Exam  /88   Pulse 76   Temp 97.8  F (36.6  C) (Tympanic)   Resp 16   Ht 1.695 m (5' 6.73\")   Wt 90.7 kg (200 lb)   SpO2 99%   BMI 31.58 kg/m     Estimated body mass index is 31.58 kg/m  as calculated from the following:    Height as of this encounter: 1.695 m (5' 6.73\").    Weight as of this encounter: 90.7 kg (200 lb).    Physical Exam  GENERAL: alert and no distress  EYES: Eyes grossly normal to inspection, PERRL and conjunctivae and sclerae normal  HENT: ear canals and TM's normal, nose and mouth without ulcers or lesions  NECK: no adenopathy, no asymmetry, masses, or scars  RESP: lungs clear to auscultation - no rales, " rhonchi or wheezes  CV: regular rate and rhythm, normal S1 S2, no S3 or S4, no murmur, click or rub, no peripheral edema  ABDOMEN: soft, nontender, no hepatosplenomegaly, no masses and bowel sounds normal  MS: no gross musculoskeletal defects noted, no edema  SKIN: no suspicious lesions or rashes  NEURO: Normal strength and tone, mentation intact and speech normal        Signed Electronically by: Kareem Michelle MD

## 2025-06-17 ENCOUNTER — VIRTUAL VISIT (OUTPATIENT)
Dept: FAMILY MEDICINE | Facility: CLINIC | Age: 55
End: 2025-06-17
Payer: COMMERCIAL

## 2025-06-17 DIAGNOSIS — R39.15 URINARY URGENCY: Primary | ICD-10-CM

## 2025-06-17 DIAGNOSIS — N32.81 OAB (OVERACTIVE BLADDER): ICD-10-CM

## 2025-06-17 PROCEDURE — 98005 SYNCH AUDIO-VIDEO EST LOW 20: CPT | Performed by: FAMILY MEDICINE

## 2025-06-17 RX ORDER — OXYBUTYNIN CHLORIDE 5 MG/1
5 TABLET, EXTENDED RELEASE ORAL DAILY
Qty: 90 TABLET | Refills: 1 | Status: SHIPPED | OUTPATIENT
Start: 2025-06-17

## 2025-06-17 NOTE — PROGRESS NOTES
"Richard is a 54 year old who is being evaluated via a billable video visit.    How would you like to obtain your AVS? MyChart  If the video visit is dropped, the invitation should be resent by: Text to cell phone: [phone]  Will anyone else be joining your video visit? No      Assessment & Plan     Urinary urgency  Has been worsening with sign of OAB, will have him to check on lab for further evaluation   - UA with Microscopic reflex to Culture - lab collect; Future  - PSA, screen; Future    OAB (overactive bladder)  Mentioned above, will have him to try oxybutinin as needed   - UA with Microscopic reflex to Culture - lab collect; Future  - PSA, screen; Future  - oxyBUTYnin ER (DITROPAN XL) 5 MG 24 hr tablet; Take 1 tablet (5 mg) by mouth daily.          BMI  Estimated body mass index is 31.58 kg/m  as calculated from the following:    Height as of 10/18/24: 1.695 m (5' 6.73\").    Weight as of 10/18/24: 90.7 kg (200 lb).         Subjective   Richard is a 54 year old, presenting for the following health issues:  Urinary Problem      6/17/2025    11:10 AM   Additional Questions   Roomed by Yovanny OLVERA     History of Present Illness       Reason for visit:  Agency to pee  Symptom onset:  More than a month  Symptoms include:  Sometimes need to pee right away  Symptom intensity:  Moderate  Symptom progression:  Staying the same  Had these symptoms before:  Yes  Has tried/received treatment for these symptoms:  No  What makes it worse:  Drinking more more fluids  What makes it better:  Drinking less or peeing   He is taking medications regularly.            Review of Systems  Constitutional, HEENT, cardiovascular, pulmonary, GI, , musculoskeletal, neuro, skin, endocrine and psych systems are negative, except as otherwise noted.      Objective           Vitals:  No vitals were obtained today due to virtual visit.    Physical Exam   GENERAL: alert and no distress  EYES: Eyes grossly normal to inspection.  No discharge or erythema, " or obvious scleral/conjunctival abnormalities.  RESP: No audible wheeze, cough, or visible cyanosis.    SKIN: Visible skin clear. No significant rash, abnormal pigmentation or lesions.  NEURO: Cranial nerves grossly intact.  Mentation and speech appropriate for age.  PSYCH: Appropriate affect, tone, and pace of words          Video-Visit Details    Type of service:  Video Visit   Originating Location (pt. Location): Home    Distant Location (provider location):  On-site  Platform used for Video Visit: [employer]  Signed Electronically by: Kareem Michelle MD

## 2025-06-21 ENCOUNTER — LAB (OUTPATIENT)
Dept: LAB | Facility: CLINIC | Age: 55
End: 2025-06-21
Payer: COMMERCIAL

## 2025-06-21 DIAGNOSIS — R39.15 URINARY URGENCY: ICD-10-CM

## 2025-06-21 DIAGNOSIS — N32.81 OAB (OVERACTIVE BLADDER): ICD-10-CM

## 2025-06-21 LAB
EST. AVERAGE GLUCOSE BLD GHB EST-MCNC: 105 MG/DL
HBA1C MFR BLD: 5.3 % (ref 0–5.6)
PSA SERPL DL<=0.01 NG/ML-MCNC: 1.18 NG/ML (ref 0–3.5)

## 2025-06-21 PROCEDURE — G0103 PSA SCREENING: HCPCS

## 2025-06-21 PROCEDURE — 36415 COLL VENOUS BLD VENIPUNCTURE: CPT

## 2025-06-21 PROCEDURE — 83036 HEMOGLOBIN GLYCOSYLATED A1C: CPT

## 2025-09-03 DIAGNOSIS — F41.1 GAD (GENERALIZED ANXIETY DISORDER): ICD-10-CM
